# Patient Record
Sex: FEMALE | Race: BLACK OR AFRICAN AMERICAN | Employment: OTHER | URBAN - METROPOLITAN AREA
[De-identification: names, ages, dates, MRNs, and addresses within clinical notes are randomized per-mention and may not be internally consistent; named-entity substitution may affect disease eponyms.]

---

## 2017-01-24 ENCOUNTER — PATIENT OUTREACH (OUTPATIENT)
Dept: FAMILY MEDICINE CLINIC | Facility: CLINIC | Age: 72
End: 2017-01-24

## 2017-01-24 NOTE — PROGRESS NOTES
Called Pt. To inform that she is due for her Medicare Annual Wellness visit. The phone number on file is out of service.

## 2017-03-17 ENCOUNTER — OFFICE VISIT (OUTPATIENT)
Dept: FAMILY MEDICINE CLINIC | Facility: CLINIC | Age: 72
End: 2017-03-17

## 2017-03-17 VITALS
HEART RATE: 68 BPM | DIASTOLIC BLOOD PRESSURE: 55 MMHG | SYSTOLIC BLOOD PRESSURE: 92 MMHG | BODY MASS INDEX: 36.85 KG/M2 | HEIGHT: 61 IN | WEIGHT: 195.19 LBS

## 2017-03-17 DIAGNOSIS — I50.9 CHRONIC CONGESTIVE HEART FAILURE, UNSPECIFIED CONGESTIVE HEART FAILURE TYPE: ICD-10-CM

## 2017-03-17 DIAGNOSIS — R26.81 GAIT INSTABILITY: ICD-10-CM

## 2017-03-17 DIAGNOSIS — I95.2 HYPOTENSION DUE TO DRUGS: ICD-10-CM

## 2017-03-17 DIAGNOSIS — R42 DIZZINESS: ICD-10-CM

## 2017-03-17 DIAGNOSIS — R26.89 BALANCE PROBLEM: ICD-10-CM

## 2017-03-17 DIAGNOSIS — D50.9 IRON DEFICIENCY ANEMIA, UNSPECIFIED IRON DEFICIENCY ANEMIA TYPE: ICD-10-CM

## 2017-03-17 DIAGNOSIS — E66.01 SEVERE OBESITY (BMI 35.0-39.9): ICD-10-CM

## 2017-03-17 DIAGNOSIS — E11.9 TYPE 2 DIABETES MELLITUS WITHOUT COMPLICATION, WITHOUT LONG-TERM CURRENT USE OF INSULIN (HCC): Primary | ICD-10-CM

## 2017-03-17 DIAGNOSIS — R29.898 WEAKNESS OF BOTH LOWER EXTREMITIES: ICD-10-CM

## 2017-03-17 DIAGNOSIS — I10 ESSENTIAL HYPERTENSION: ICD-10-CM

## 2017-03-17 DIAGNOSIS — Z95.0 PACEMAKER: ICD-10-CM

## 2017-03-17 DIAGNOSIS — E87.6 HYPOKALEMIA: ICD-10-CM

## 2017-03-17 PROCEDURE — 99205 OFFICE O/P NEW HI 60 MIN: CPT | Performed by: FAMILY MEDICINE

## 2017-03-17 RX ORDER — LOSARTAN POTASSIUM 100 MG/1
TABLET ORAL
COMMUNITY
End: 2017-09-28

## 2017-03-17 RX ORDER — MINERAL OIL 100 MG/100ML
OIL ORAL; TOPICAL
Qty: 1 DEVICE | Refills: 0 | Status: SHIPPED | OUTPATIENT
Start: 2017-03-17

## 2017-03-17 RX ORDER — CARVEDILOL 25 MG/1
25 TABLET ORAL 2 TIMES DAILY WITH MEALS
COMMUNITY
End: 2017-03-17

## 2017-03-17 RX ORDER — POTASSIUM CHLORIDE 750 MG/1
10 CAPSULE, EXTENDED RELEASE ORAL DAILY
COMMUNITY
End: 2017-04-03 | Stop reason: DRUGHIGH

## 2017-03-17 RX ORDER — FUROSEMIDE 40 MG/1
40 TABLET ORAL 2 TIMES DAILY
COMMUNITY
Start: 2016-01-19 | End: 2017-12-20

## 2017-03-17 RX ORDER — CARVEDILOL 25 MG/1
25 TABLET ORAL 2 TIMES DAILY WITH MEALS
Qty: 30 TABLET | Refills: 0 | Status: SHIPPED | OUTPATIENT
Start: 2017-03-17 | End: 2017-03-28

## 2017-03-17 RX ORDER — AMLODIPINE BESYLATE 5 MG/1
TABLET ORAL
COMMUNITY
End: 2017-09-28

## 2017-03-17 RX ORDER — HYDROCHLOROTHIAZIDE 25 MG/1
25 TABLET ORAL DAILY
COMMUNITY
End: 2017-12-20

## 2017-03-17 NOTE — PATIENT INSTRUCTIONS
If your dizziness worsens, call 911. Take 1/2 tab of the Amlodipine once a day. Take 1/2 tab of the Losartan once a day. Continue the rest of your medications as instructed.     Call and make an appointment to see your cardiologist Dr. Sukhdev Burr or

## 2017-03-17 NOTE — PROGRESS NOTES
Minna Galeazzi is a 70year old female. HPI:     New patient. Patient noted to be using a cane to assist with walking, patient was questioned regarding the use of the cane. Balance issues. Sometimes feels like she might fall. Denies falls.   D Disp:  Rfl:         Penicillins             Itching, Swelling   Past Medical History   Diagnosis Date   • Type 2 diabetes mellitus without complication, without long-term current use of insulin ([facility] 3/17/2017   • Chronic congestive heart failure ([facility] 3/1 sounds normal. She has no wheezes. She has no rales. Lymphadenopathy:     She has no cervical adenopathy. Neurological: Gait abnormal.   No focal deficits   Skin: Skin is warm and dry. Psychiatric: She has a normal mood and affect.  Her behavior is no readings on. Patient to bring her blood pressure monitor with her when she follows up with the cardiologist and when she comes to our office as well.     1. Type 2 diabetes mellitus without complication, without long-term current use of insulin ([facility]  St AmLODIPine Besylate 5 MG Oral Tab; Take 0.5 tablet daily   - furosemide 40 MG Oral Tab; Take 40 mg by mouth 2 (two) times daily.  - Potassium Chloride ER 10 MEQ Oral Cap CR; Take 10 mEq by mouth daily. - hydrochlorothiazide 25 MG Oral Tab;  Take 25 mg by m Device 1 Device 0      Sig: Use as directed      carvedilol 25 MG Oral Tab 30 tablet 0      Sig: Take 1 tablet (25 mg total) by mouth 2 (two) times daily with meals.            Imaging & Consults:  OP REFERRAL TO [facility]    Return in about 2 weeks (arou

## 2017-03-20 ENCOUNTER — TELEPHONE (OUTPATIENT)
Dept: FAMILY MEDICINE CLINIC | Facility: CLINIC | Age: 72
End: 2017-03-20

## 2017-03-20 NOTE — TELEPHONE ENCOUNTER
Medical records release successfully faxed to Dr Shital Linares @[phone], requesting most recent progress note, labs and immunization records.     Medical records release successfully faxed to Dr. Mildred Shin @[phone], requesting most recent prog

## 2017-03-24 ENCOUNTER — LAB ENCOUNTER (OUTPATIENT)
Dept: LAB | Age: 72
End: 2017-03-24
Attending: FAMILY MEDICINE
Payer: MEDICARE

## 2017-03-24 DIAGNOSIS — E11.9 TYPE 2 DIABETES MELLITUS WITHOUT COMPLICATION, WITHOUT LONG-TERM CURRENT USE OF INSULIN (HCC): ICD-10-CM

## 2017-03-24 DIAGNOSIS — D50.9 IRON DEFICIENCY ANEMIA, UNSPECIFIED IRON DEFICIENCY ANEMIA TYPE: ICD-10-CM

## 2017-03-24 LAB
ALBUMIN SERPL-MCNC: 3.8 G/DL (ref 3.5–4.8)
ALP LIVER SERPL-CCNC: 67 U/L (ref 55–142)
ALT SERPL-CCNC: 15 U/L (ref 14–54)
AST SERPL-CCNC: 14 U/L (ref 15–41)
BASOPHILS # BLD AUTO: 0.03 X10(3) UL (ref 0–0.1)
BASOPHILS NFR BLD AUTO: 0.6 %
BILIRUB SERPL-MCNC: 0.4 MG/DL (ref 0.1–2)
BUN BLD-MCNC: 37 MG/DL (ref 8–20)
CALCIUM BLD-MCNC: 9.4 MG/DL (ref 8.3–10.3)
CHLORIDE: 100 MMOL/L (ref 101–111)
CHOLEST SMN-MCNC: 191 MG/DL (ref ?–200)
CO2: 33 MMOL/L (ref 22–32)
CREAT BLD-MCNC: 1.66 MG/DL (ref 0.55–1.02)
CREAT UR-SCNC: 130 MG/DL
DEPRECATED HBV CORE AB SER IA-ACNC: 74.2 NG/ML (ref 10–291)
EOSINOPHIL # BLD AUTO: 0.21 X10(3) UL (ref 0–0.3)
EOSINOPHIL NFR BLD AUTO: 4.2 %
ERYTHROCYTE [DISTWIDTH] IN BLOOD BY AUTOMATED COUNT: 13.9 % (ref 11.5–16)
EST. AVERAGE GLUCOSE BLD GHB EST-MCNC: 123 MG/DL (ref 68–126)
FREE T4: 1 NG/DL (ref 0.9–1.8)
GLUCOSE BLD-MCNC: 96 MG/DL (ref 70–99)
HBA1C MFR BLD HPLC: 5.9 % (ref ?–5.7)
HCT VFR BLD AUTO: 31.1 % (ref 34–50)
HDLC SERPL-MCNC: 70 MG/DL (ref 45–?)
HDLC SERPL: 2.73 {RATIO} (ref ?–4.44)
HGB BLD-MCNC: 9.9 G/DL (ref 12–16)
IMMATURE GRANULOCYTE COUNT: 0 X10(3) UL (ref 0–1)
IMMATURE GRANULOCYTE RATIO %: 0 %
IRON SATURATION: 16 % (ref 13–45)
IRON: 56 UG/DL (ref 28–170)
LDLC SERPL CALC-MCNC: 106 MG/DL (ref ?–130)
LYMPHOCYTES # BLD AUTO: 2.09 X10(3) UL (ref 0.9–4)
LYMPHOCYTES NFR BLD AUTO: 42.1 %
M PROTEIN MFR SERPL ELPH: 7.5 G/DL (ref 6.1–8.3)
MCH RBC QN AUTO: 28.9 PG (ref 27–33.2)
MCHC RBC AUTO-ENTMCNC: 31.8 G/DL (ref 31–37)
MCV RBC AUTO: 90.7 FL (ref 81–100)
MICROALBUMIN UR-MCNC: <0.5 MG/DL
MONOCYTES # BLD AUTO: 0.8 X10(3) UL (ref 0.1–0.6)
MONOCYTES NFR BLD AUTO: 16.1 %
NEUTROPHIL ABS PRELIM: 1.83 X10 (3) UL (ref 1.3–6.7)
NEUTROPHILS # BLD AUTO: 1.83 X10(3) UL (ref 1.3–6.7)
NEUTROPHILS NFR BLD AUTO: 37 %
NONHDLC SERPL-MCNC: 121 MG/DL (ref ?–130)
PLATELET # BLD AUTO: 211 10(3)UL (ref 150–450)
POTASSIUM SERPL-SCNC: 3.5 MMOL/L (ref 3.6–5.1)
RBC # BLD AUTO: 3.43 X10(6)UL (ref 3.8–5.1)
RED CELL DISTRIBUTION WIDTH-SD: 46.1 FL (ref 35.1–46.3)
SODIUM SERPL-SCNC: 140 MMOL/L (ref 136–144)
TOTAL IRON BINDING CAPACITY: 344 UG/DL (ref 298–536)
TRANSFERRIN: 231 MG/DL (ref 200–360)
TRIGLYCERIDES: 73 MG/DL (ref ?–150)
TSI SER-ACNC: 1.5 MIU/ML (ref 0.35–5.5)
VLDL: 15 MG/DL (ref 5–40)
WBC # BLD AUTO: 5 X10(3) UL (ref 4–13)

## 2017-03-24 PROCEDURE — 80053 COMPREHEN METABOLIC PANEL: CPT

## 2017-03-24 PROCEDURE — 82728 ASSAY OF FERRITIN: CPT

## 2017-03-24 PROCEDURE — 84439 ASSAY OF FREE THYROXINE: CPT

## 2017-03-24 PROCEDURE — 36415 COLL VENOUS BLD VENIPUNCTURE: CPT

## 2017-03-24 PROCEDURE — 83036 HEMOGLOBIN GLYCOSYLATED A1C: CPT

## 2017-03-24 PROCEDURE — 84443 ASSAY THYROID STIM HORMONE: CPT

## 2017-03-24 PROCEDURE — 85025 COMPLETE CBC W/AUTO DIFF WBC: CPT

## 2017-03-24 PROCEDURE — 82043 UR ALBUMIN QUANTITATIVE: CPT

## 2017-03-24 PROCEDURE — 83540 ASSAY OF IRON: CPT

## 2017-03-24 PROCEDURE — 80061 LIPID PANEL: CPT

## 2017-03-24 PROCEDURE — 83550 IRON BINDING TEST: CPT

## 2017-03-24 PROCEDURE — 82570 ASSAY OF URINE CREATININE: CPT

## 2017-03-28 ENCOUNTER — TELEPHONE (OUTPATIENT)
Dept: FAMILY MEDICINE CLINIC | Facility: CLINIC | Age: 72
End: 2017-03-28

## 2017-03-28 DIAGNOSIS — I50.9 CHRONIC CONGESTIVE HEART FAILURE, UNSPECIFIED CONGESTIVE HEART FAILURE TYPE: Primary | ICD-10-CM

## 2017-03-28 DIAGNOSIS — I10 ESSENTIAL HYPERTENSION: ICD-10-CM

## 2017-03-28 RX ORDER — CARVEDILOL 25 MG/1
25 TABLET ORAL 2 TIMES DAILY WITH MEALS
Qty: 60 TABLET | Refills: 0 | Status: SHIPPED | OUTPATIENT
Start: 2017-03-28 | End: 2017-04-06

## 2017-03-28 NOTE — TELEPHONE ENCOUNTER
A 15 day supply of the carvedilol was sent on 3/17/2017  A 30 day supply was sent today  Future Appointments  Date Time Provider Toni Neville   4/3/2017 12:00 PM Gary Cordero DO [facility] [place]

## 2017-04-03 ENCOUNTER — OFFICE VISIT (OUTPATIENT)
Dept: FAMILY MEDICINE CLINIC | Facility: CLINIC | Age: 72
End: 2017-04-03

## 2017-04-03 VITALS
BODY MASS INDEX: 36.75 KG/M2 | SYSTOLIC BLOOD PRESSURE: 103 MMHG | DIASTOLIC BLOOD PRESSURE: 59 MMHG | HEART RATE: 70 BPM | HEIGHT: 61 IN | WEIGHT: 194.63 LBS

## 2017-04-03 DIAGNOSIS — E11.22 DIABETES MELLITUS WITH STAGE 3 CHRONIC KIDNEY DISEASE (HCC): ICD-10-CM

## 2017-04-03 DIAGNOSIS — Z95.0 PACEMAKER: ICD-10-CM

## 2017-04-03 DIAGNOSIS — I50.9 CHRONIC CONGESTIVE HEART FAILURE, UNSPECIFIED CONGESTIVE HEART FAILURE TYPE: ICD-10-CM

## 2017-04-03 DIAGNOSIS — Z12.11 SCREENING FOR MALIGNANT NEOPLASM OF COLON: ICD-10-CM

## 2017-04-03 DIAGNOSIS — D50.9 IRON DEFICIENCY ANEMIA, UNSPECIFIED IRON DEFICIENCY ANEMIA TYPE: ICD-10-CM

## 2017-04-03 DIAGNOSIS — N18.30 DIABETES MELLITUS WITH STAGE 3 CHRONIC KIDNEY DISEASE (HCC): ICD-10-CM

## 2017-04-03 DIAGNOSIS — Z12.31 ENCOUNTER FOR SCREENING MAMMOGRAM FOR MALIGNANT NEOPLASM OF BREAST: ICD-10-CM

## 2017-04-03 DIAGNOSIS — I95.2 HYPOTENSION DUE TO DRUGS: ICD-10-CM

## 2017-04-03 DIAGNOSIS — Z79.84 LONG TERM CURRENT USE OF ORAL HYPOGLYCEMIC DRUG: ICD-10-CM

## 2017-04-03 DIAGNOSIS — R42 DIZZINESS: ICD-10-CM

## 2017-04-03 DIAGNOSIS — I10 ESSENTIAL HYPERTENSION: ICD-10-CM

## 2017-04-03 DIAGNOSIS — I13.0 MALIGNANT HYPERTENSIVE HEART AND RENAL DISEASE WITH HEART FAILURE (HCC): ICD-10-CM

## 2017-04-03 DIAGNOSIS — Z00.00 ENCOUNTER FOR ANNUAL HEALTH EXAMINATION: Primary | ICD-10-CM

## 2017-04-03 DIAGNOSIS — D64.9 ANEMIA, UNSPECIFIED TYPE: ICD-10-CM

## 2017-04-03 DIAGNOSIS — E87.6 HYPOKALEMIA: ICD-10-CM

## 2017-04-03 DIAGNOSIS — E66.01 SEVERE OBESITY (BMI 35.0-39.9): ICD-10-CM

## 2017-04-03 PROCEDURE — 96160 PT-FOCUSED HLTH RISK ASSMT: CPT | Performed by: FAMILY MEDICINE

## 2017-04-03 RX ORDER — POTASSIUM CHLORIDE 1.5 G/1.77G
20 POWDER, FOR SOLUTION ORAL DAILY
Qty: 30 PACKET | Refills: 5 | Status: SHIPPED | OUTPATIENT
Start: 2017-04-03 | End: 2017-08-09 | Stop reason: CLARIF

## 2017-04-03 NOTE — PATIENT INSTRUCTIONS
Please call our office in 5 days to see if the referral to see your cardiologist has been approved. The referral needs to be approved before you can make an appointment to see Dr. Kari Hare. Discontinue the iron for now.     Please make an appointment

## 2017-04-03 NOTE — PROGRESS NOTES
HPI:   Minna Galeazzi is a 70year old female who presents for a MA (Medicare Advantage) Supervisit (Once per calendar year). Dizziness resolved since decreasing some of the BP meds.     Declines screening mammogram. But then changes mind at end o hydrochlorothiazide 25 MG Oral Tab Take 25 mg by mouth daily. losartan 100 MG Oral Tab Take 0.5 tablet daily. MetFORMIN HCl 500 MG Oral Tab Take 500 mg by mouth 2 (two) times daily with meals.    IRON OR    Blood Pressure Monitor Does not apply Devibehzad Assessment  (Required for AWV/SWV)    Finger Rub       /59 mmHg  Pulse 70  Ht 61\"  Wt 194 lb 9.6 oz  BMI 36.79 kg/m2  General appearance: alert, appears stated age and cooperative  Head: Normocephalic, without obvious abnormality, atraumatic  Eyes: 11:07 AM   Result Value Ref Range   Cholesterol, Total 191 <200 mg/dL   Triglycerides 73 <150 mg/dL   HDL Cholesterol 70 >45 mg/dL   LDL Cholesterol 106 <130 mg/dL   VLDL 15 5-40 mg/dL   Chol/HDL Ratio 2.73 <4.44   Non HDL Chol 121 <130 mg/dL   -ASSAY, THY Basophil % 0.6 %   Immature Granulocyte % 0.0 %         ASSESSMENT AND OTHER RELEVANT CHRONIC CONDITIONS:   Mono Muñoz is a 70year old female who presents for a Medicare Assessment.      Patient signed record release to obtain immunization recor foods.  - potassium chloride 20 MEQ Oral Powd Pack; Take 20 mEq by mouth daily. Dispense: 30 packet; Refill: 5    10. Iron deficiency anemia, unspecified iron deficiency anemia type  Okay to continue iron supplement for now.       11. Hypotension due to dr Directive:  Living Will on file in [facility]? Ayala Shah does not have a Living Will on file in [facility]  Discussed with patient and provided information      Healthcare Power of Justo on file in Epic:    Ayala Shah does not have a Power of At difficulty seeing?: 0-No    Do you have any difficulty walking or getting up?: 0-No    Do you have any tripping hazards?: 0-No    Are you on multiple medications?: 1-Yes    Does pain affect your day to day activities?: 1-Yes     Have you had any memory iss Annually: Diabetics, FHx Glaucoma, AA>50, > 65 No flowsheet data found. Bone Density Screening      Dexascan Every two years No results found for this or any previous visit. No flowsheet data found.     Pap and Pelvic      Pap: Every 3 yrs age 24 Date Value   03/24/2017 3.5*    No flowsheet data found. Creatinine  Annually CREATININE (mg/dL)   Date Value   03/24/2017 1.66*    No flowsheet data found. BUN  Annually BUN (mg/dL)   Date Value   03/24/2017 37*    No flowsheet data found.      [place]

## 2017-04-04 PROBLEM — N18.30 CKD (CHRONIC KIDNEY DISEASE) STAGE 3, GFR 30-59 ML/MIN (HCC): Status: ACTIVE | Noted: 2017-04-04

## 2017-04-04 PROBLEM — R73.9 HYPERGLYCEMIA: Status: RESOLVED | Noted: 2017-04-04 | Resolved: 2017-04-04

## 2017-04-04 PROBLEM — D64.9 ANEMIA: Status: ACTIVE | Noted: 2017-04-04

## 2017-04-04 PROBLEM — R73.9 HYPERGLYCEMIA: Status: ACTIVE | Noted: 2017-04-04

## 2017-04-05 ENCOUNTER — TELEPHONE (OUTPATIENT)
Dept: FAMILY MEDICINE CLINIC | Facility: CLINIC | Age: 72
End: 2017-04-05

## 2017-04-05 NOTE — TELEPHONE ENCOUNTER
Spoke to patient's daughter with name of Dr. Hansa Aldrich and number that they will contact for her cardio appt. Explained that the Dr. Siobhan Dykes was listed the first time is not in their HMO. She verbalized understanding.

## 2017-04-06 DIAGNOSIS — I50.9 CHRONIC CONGESTIVE HEART FAILURE, UNSPECIFIED CONGESTIVE HEART FAILURE TYPE: Primary | ICD-10-CM

## 2017-04-06 DIAGNOSIS — I10 ESSENTIAL HYPERTENSION: ICD-10-CM

## 2017-04-06 NOTE — TELEPHONE ENCOUNTER
Fax request received from [phone] requesting 90 day rx for carvedilol    Dr Tyrone Lloyd for this refill or will patient be getting this from the cardiologist?

## 2017-04-09 RX ORDER — CARVEDILOL 25 MG/1
25 TABLET ORAL 2 TIMES DAILY WITH MEALS
Qty: 180 TABLET | Refills: 0 | Status: SHIPPED | OUTPATIENT
Start: 2017-04-09 | End: 2017-08-02

## 2017-04-10 ENCOUNTER — TELEPHONE (OUTPATIENT)
Dept: FAMILY MEDICINE CLINIC | Facility: CLINIC | Age: 72
End: 2017-04-10

## 2017-04-10 NOTE — TELEPHONE ENCOUNTER
This med was already sent on 4/9/17 to [facility] [place]. Pt informed of this and she said that she got only #60 tablets. Asked pt to call [facility] and explain that #180 was sent over on 4/9/17. Pt verbalized understanding.

## 2017-05-12 ENCOUNTER — MED REC SCAN ONLY (OUTPATIENT)
Dept: FAMILY MEDICINE CLINIC | Facility: CLINIC | Age: 72
End: 2017-05-12

## 2017-05-12 PROBLEM — I42.0 CARDIOMYOPATHY, DILATED (HCC): Status: ACTIVE | Noted: 2017-05-12

## 2017-06-06 ENCOUNTER — TELEPHONE (OUTPATIENT)
Dept: FAMILY MEDICINE CLINIC | Facility: CLINIC | Age: 72
End: 2017-06-06

## 2017-06-06 DIAGNOSIS — Z95.0 PACEMAKER: Primary | ICD-10-CM

## 2017-06-06 DIAGNOSIS — I50.9 CHRONIC CONGESTIVE HEART FAILURE, UNSPECIFIED CONGESTIVE HEART FAILURE TYPE: ICD-10-CM

## 2017-06-06 DIAGNOSIS — I42.0 CARDIOMYOPATHY, DILATED (HCC): ICD-10-CM

## 2017-06-06 NOTE — TELEPHONE ENCOUNTER
Dr Дмитрий Rivera advise. Looking at [facility] list for cardiology there is a [facility] group in [place].  They appear to be only group in [facility]

## 2017-06-06 NOTE — TELEPHONE ENCOUNTER
Pt called and said she needs Dr. Ema Gaspar to refer her to a doctor in [address] [place] [address]) to check her pacemaker. She said [place] she has to go to [place] she has to transfer 3 times before she can get to her destination.

## 2017-06-16 ENCOUNTER — TELEPHONE (OUTPATIENT)
Dept: FAMILY MEDICINE CLINIC | Facility: CLINIC | Age: 72
End: 2017-06-16

## 2017-06-16 DIAGNOSIS — E11.9 TYPE 2 DIABETES MELLITUS WITHOUT COMPLICATION, WITHOUT LONG-TERM CURRENT USE OF INSULIN (HCC): Primary | ICD-10-CM

## 2017-06-16 NOTE — TELEPHONE ENCOUNTER
Refill request received from [facility] for metformin 500 mg 1 tab bid  Per ov 4/3/17:2.  Type 2 diabetes mellitus without complication, without long-term current use of insulin ([address]  Continue metformin.  Patient counseled on healthy diet and regular

## 2017-06-16 NOTE — TELEPHONE ENCOUNTER
Left message for Kenisha Kathleen @ Dr John Salcedo office. We do not have an EKG tracing for this patient.

## 2017-06-21 PROBLEM — I50.22 CHRONIC SYSTOLIC CONGESTIVE HEART FAILURE (HCC): Status: ACTIVE | Noted: 2017-06-21

## 2017-07-27 ENCOUNTER — TELEPHONE (OUTPATIENT)
Dept: FAMILY MEDICINE CLINIC | Facility: CLINIC | Age: 72
End: 2017-07-27

## 2017-07-27 DIAGNOSIS — I10 ESSENTIAL HYPERTENSION: ICD-10-CM

## 2017-07-27 DIAGNOSIS — I50.9 CHRONIC CONGESTIVE HEART FAILURE, UNSPECIFIED CONGESTIVE HEART FAILURE TYPE: Primary | ICD-10-CM

## 2017-07-27 NOTE — TELEPHONE ENCOUNTER
Pt was given an order for a BP machine back in March. Pt took this to [facility] to get filled. Now per [facility] they billed an incorrect insurance for this item and it was rejected now they want to try to bill it to [facility] for pt.     Explained

## 2017-08-02 DIAGNOSIS — I50.9 CHRONIC CONGESTIVE HEART FAILURE, UNSPECIFIED CONGESTIVE HEART FAILURE TYPE: ICD-10-CM

## 2017-08-02 DIAGNOSIS — I10 ESSENTIAL HYPERTENSION: ICD-10-CM

## 2017-08-02 RX ORDER — CARVEDILOL 25 MG/1
TABLET ORAL
Qty: 180 TABLET | Refills: 0 | Status: SHIPPED | OUTPATIENT
Start: 2017-08-02 | End: 2018-05-15

## 2017-08-09 ENCOUNTER — TELEPHONE (OUTPATIENT)
Dept: FAMILY MEDICINE CLINIC | Facility: CLINIC | Age: 72
End: 2017-08-09

## 2017-08-09 RX ORDER — POTASSIUM CHLORIDE 1500 MG/1
20 TABLET, FILM COATED, EXTENDED RELEASE ORAL DAILY
Qty: 90 TABLET | Refills: 0 | OUTPATIENT
Start: 2017-08-09 | End: 2017-09-08

## 2017-08-09 NOTE — TELEPHONE ENCOUNTER
Spoke to [facility] and he said that the potassium script was never sent to patient back in April  because it was too expensive for pt because it was the powder packets so called in today the oral pills for 90 days.   Spoke to [place], [facility] and called

## 2017-08-21 DIAGNOSIS — E11.9 TYPE 2 DIABETES MELLITUS WITHOUT COMPLICATION, WITHOUT LONG-TERM CURRENT USE OF INSULIN (HCC): ICD-10-CM

## 2017-09-28 DIAGNOSIS — E87.6 HYPOKALEMIA: ICD-10-CM

## 2017-09-28 DIAGNOSIS — E11.9 TYPE 2 DIABETES MELLITUS WITHOUT COMPLICATION, WITHOUT LONG-TERM CURRENT USE OF INSULIN (HCC): Primary | ICD-10-CM

## 2017-09-28 DIAGNOSIS — I50.9 CHRONIC CONGESTIVE HEART FAILURE, UNSPECIFIED CONGESTIVE HEART FAILURE TYPE: ICD-10-CM

## 2017-09-28 DIAGNOSIS — N18.30 CKD (CHRONIC KIDNEY DISEASE) STAGE 3, GFR 30-59 ML/MIN (HCC): ICD-10-CM

## 2017-09-28 DIAGNOSIS — I10 ESSENTIAL HYPERTENSION: ICD-10-CM

## 2017-09-28 RX ORDER — AMLODIPINE BESYLATE 5 MG/1
2.5 TABLET ORAL DAILY
Qty: 45 TABLET | Refills: 0 | Status: SHIPPED | OUTPATIENT
Start: 2017-09-28 | End: 2017-11-08

## 2017-09-28 RX ORDER — LOSARTAN POTASSIUM 100 MG/1
50 TABLET ORAL DAILY
Qty: 45 TABLET | Refills: 0 | Status: SHIPPED | OUTPATIENT
Start: 2017-09-28 | End: 2017-11-08 | Stop reason: DRUGHIGH

## 2017-09-28 NOTE — TELEPHONE ENCOUNTER
Pt needs a refill on Losartan 100mg 1 daily,  Amlodipine 5mg 1 daily. She would like them sent to [facility].

## 2017-09-28 NOTE — TELEPHONE ENCOUNTER
Dr Deyvi Tovar advise refills of losartan and amlodipine. You have not prescribed these in the past for the patient.  Will you be prescribing these or will her cardiologist?    Patient is asking for a 90 day supply and she was to return 7/3/17

## 2017-09-28 NOTE — TELEPHONE ENCOUNTER
Meds approved.     Labs ordered, please have pt get them done so results area available for her upcoming appt 10/18/2017

## 2017-09-29 NOTE — TELEPHONE ENCOUNTER
Spoke to patient with instructions and pt verbalized understanding.   Gave her the number to [employer] to schedule her lab appt

## 2017-10-03 NOTE — TELEPHONE ENCOUNTER
Faxed refill request from [facility] for refill of HCTZ 25 mg  Dr Willi Mejia advise refill.  You have not prescribed this for the patient in the past

## 2017-10-05 NOTE — TELEPHONE ENCOUNTER
Refill request should go to her cardiologist.  Also, Please call patient's cardiologist and find out what meds she is supposed to be on. For example, is she taking both furosemide and HCTZ?

## 2017-10-10 RX ORDER — POTASSIUM CHLORIDE 20 MEQ/1
TABLET, EXTENDED RELEASE ORAL
Qty: 90 TABLET | Refills: 0 | Status: SHIPPED | OUTPATIENT
Start: 2017-10-10 | End: 2017-12-11

## 2017-10-12 RX ORDER — HYDROCHLOROTHIAZIDE 25 MG/1
25 TABLET ORAL DAILY
Qty: 90 TABLET | Refills: 0 | OUTPATIENT
Start: 2017-10-12

## 2017-10-23 DIAGNOSIS — E11.9 TYPE 2 DIABETES MELLITUS WITHOUT COMPLICATION, WITHOUT LONG-TERM CURRENT USE OF INSULIN (HCC): ICD-10-CM

## 2017-10-24 NOTE — TELEPHONE ENCOUNTER
Unable to refill metformin for 90 days  Patient was due for a f/u in 07/17  Please call to schedule appt  [phone] (home)   Can send 30 day supply to local pharmacy

## 2017-11-08 ENCOUNTER — TELEPHONE (OUTPATIENT)
Dept: FAMILY MEDICINE CLINIC | Facility: CLINIC | Age: 72
End: 2017-11-08

## 2017-11-08 ENCOUNTER — OFFICE VISIT (OUTPATIENT)
Dept: FAMILY MEDICINE CLINIC | Facility: CLINIC | Age: 72
End: 2017-11-08

## 2017-11-08 VITALS
HEART RATE: 67 BPM | SYSTOLIC BLOOD PRESSURE: 99 MMHG | BODY MASS INDEX: 37.04 KG/M2 | DIASTOLIC BLOOD PRESSURE: 64 MMHG | WEIGHT: 196.19 LBS | HEIGHT: 61 IN

## 2017-11-08 DIAGNOSIS — M79.601 PARESTHESIA AND PAIN OF BOTH UPPER EXTREMITIES: ICD-10-CM

## 2017-11-08 DIAGNOSIS — Z23 NEED FOR VACCINATION: ICD-10-CM

## 2017-11-08 DIAGNOSIS — E11.9 TYPE 2 DIABETES MELLITUS WITHOUT COMPLICATION, WITHOUT LONG-TERM CURRENT USE OF INSULIN (HCC): Primary | ICD-10-CM

## 2017-11-08 DIAGNOSIS — R20.2 PARESTHESIA AND PAIN OF BOTH UPPER EXTREMITIES: ICD-10-CM

## 2017-11-08 DIAGNOSIS — R20.2 PARESTHESIA OF BOTH LOWER EXTREMITIES: ICD-10-CM

## 2017-11-08 DIAGNOSIS — I10 ESSENTIAL HYPERTENSION: ICD-10-CM

## 2017-11-08 DIAGNOSIS — M79.602 PARESTHESIA AND PAIN OF BOTH UPPER EXTREMITIES: ICD-10-CM

## 2017-11-08 DIAGNOSIS — I50.9 CHRONIC CONGESTIVE HEART FAILURE, UNSPECIFIED CONGESTIVE HEART FAILURE TYPE: ICD-10-CM

## 2017-11-08 PROCEDURE — 90653 IIV ADJUVANT VACCINE IM: CPT | Performed by: FAMILY MEDICINE

## 2017-11-08 PROCEDURE — G0008 ADMIN INFLUENZA VIRUS VAC: HCPCS | Performed by: FAMILY MEDICINE

## 2017-11-08 PROCEDURE — 99214 OFFICE O/P EST MOD 30 MIN: CPT | Performed by: FAMILY MEDICINE

## 2017-11-08 RX ORDER — CARVEDILOL 25 MG/1
TABLET ORAL
Qty: 180 TABLET | Refills: 0 | Status: CANCELLED | OUTPATIENT
Start: 2017-11-08

## 2017-11-08 RX ORDER — HYDROCHLOROTHIAZIDE 25 MG/1
25 TABLET ORAL DAILY
Refills: 0 | Status: CANCELLED | OUTPATIENT
Start: 2017-11-08

## 2017-11-08 RX ORDER — LOSARTAN POTASSIUM 50 MG/1
50 TABLET ORAL DAILY
Qty: 90 TABLET | Refills: 1 | Status: SHIPPED | OUTPATIENT
Start: 2017-11-08 | End: 2017-12-20

## 2017-11-08 RX ORDER — LOSARTAN POTASSIUM 100 MG/1
50 TABLET ORAL DAILY
Qty: 45 TABLET | Refills: 0 | Status: CANCELLED | OUTPATIENT
Start: 2017-11-08

## 2017-11-08 RX ORDER — FUROSEMIDE 40 MG/1
40 TABLET ORAL 2 TIMES DAILY
Refills: 0 | Status: CANCELLED | OUTPATIENT
Start: 2017-11-08

## 2017-11-08 RX ORDER — AMLODIPINE BESYLATE 5 MG/1
2.5 TABLET ORAL DAILY
Qty: 45 TABLET | Refills: 0 | Status: CANCELLED | OUTPATIENT
Start: 2017-11-08

## 2017-11-08 NOTE — PROGRESS NOTES
HPI:   Anabell Murphy is a 67year old female      Warming sensations or cool sensations in wrist to elbow and ankle to the calf, both on both sides. Several weeks. Nothing seems to make it better or worse.     Type 2 diabetes mellitus:  Past due for • Iron deficiency anemia 3/17/2017   • Severe obesity (BMI 35.0-39.9) ([facility] 3/17/2017   • Type 2 diabetes mellitus without complication, without long-term current use of insulin ([facility] 3/17/2017      Past Surgical History:  3/12/2015: Nisha Clayton mg/dL 121   HEMOGLOBIN A1c      <5.7 % 5.9 (H)   ESTIMATED AVERAGE GLUCOSE      68 - 126 mg/dL 123         ASSESSMENT AND PLAN:   Bill Morales is a 67year old female     Type 2 diabetes mellitus without complication, without long-term current use o extremities  Etiology uncertain. Check B12 level to start.  - VITAMIN B12; Future    5. Paresthesia of both lower extremities  As above in #4.  - VITAMIN B12; Future    6.  Need for vaccination  - FLU VACCINE ADJUVANT IM              Orders Placed This Enc

## 2017-11-09 NOTE — TELEPHONE ENCOUNTER
Please call patient: Patient needs to call her cardiologist, Dr. Ck Arita, to request refills on her carvedilol, furosemide and hydrochlorothiazide. Please provide her with Dr. Rosemary Hardwick phone number.   I did send in a prescription for the losartan and metf

## 2017-11-16 ENCOUNTER — LAB ENCOUNTER (OUTPATIENT)
Dept: LAB | Age: 72
End: 2017-11-16
Attending: FAMILY MEDICINE
Payer: MEDICARE

## 2017-11-16 DIAGNOSIS — R20.2 PARESTHESIA AND PAIN OF BOTH UPPER EXTREMITIES: ICD-10-CM

## 2017-11-16 DIAGNOSIS — M79.602 PARESTHESIA AND PAIN OF BOTH UPPER EXTREMITIES: ICD-10-CM

## 2017-11-16 DIAGNOSIS — R20.2 PARESTHESIA OF BOTH LOWER EXTREMITIES: ICD-10-CM

## 2017-11-16 DIAGNOSIS — M79.601 PARESTHESIA AND PAIN OF BOTH UPPER EXTREMITIES: ICD-10-CM

## 2017-11-16 PROCEDURE — 82607 VITAMIN B-12: CPT

## 2017-11-27 ENCOUNTER — TELEPHONE (OUTPATIENT)
Dept: FAMILY MEDICINE CLINIC | Facility: CLINIC | Age: 72
End: 2017-11-27

## 2017-11-27 DIAGNOSIS — N18.30 CKD (CHRONIC KIDNEY DISEASE) STAGE 3, GFR 30-59 ML/MIN (HCC): Primary | ICD-10-CM

## 2017-11-27 NOTE — TELEPHONE ENCOUNTER
----- Message from Yajaira Overton DO sent at 11/26/2017 10:00 PM CST -----  Please call patient: Please remind patient she is due to see me for follow-up appointment the first week of December, please have patient make an appointment.   Diabetes is well c

## 2017-11-27 NOTE — TELEPHONE ENCOUNTER
----- Message from Edilberto Deleon DO sent at 11/26/2017 10:01 PM CST -----  Please see other result note, B12 in normal range

## 2017-11-27 NOTE — TELEPHONE ENCOUNTER
Pt informed of test results.   She states that she has been checking her blood pressure at home and it has been running between 120/69 and 120/78, she feels that at this time there is no reason for her to follow up with the cardiologist.   Pt states that sh

## 2017-11-28 NOTE — TELEPHONE ENCOUNTER
Please call patient:  1. I see that patient does have an appointment to see her cardiologist Dr. Vladislav Palmer scheduled for December 20, 2017, please remind patient to keep this appointment.   2.  Referral placed for patient to see a kidney specialist Dr. Hue Gonzalez

## 2017-11-29 NOTE — TELEPHONE ENCOUNTER
Spoke to patient with instructions and she states she does not have an appt scheduled in Dec. With cardiologist but she will contact their office to schedule appt. She will also call to schedule with the kidney doctor as well.

## 2017-12-06 RX ORDER — AMLODIPINE BESYLATE 5 MG/1
TABLET ORAL
Qty: 45 TABLET | Refills: 0 | Status: SHIPPED | OUTPATIENT
Start: 2017-12-06 | End: 2017-12-20

## 2017-12-12 RX ORDER — POTASSIUM CHLORIDE 20 MEQ/1
TABLET, EXTENDED RELEASE ORAL
Qty: 90 TABLET | Refills: 0 | Status: SHIPPED | OUTPATIENT
Start: 2017-12-12 | End: 2017-12-20

## 2018-01-16 DIAGNOSIS — E11.9 TYPE 2 DIABETES MELLITUS WITHOUT COMPLICATION, WITHOUT LONG-TERM CURRENT USE OF INSULIN (HCC): ICD-10-CM

## 2018-02-06 ENCOUNTER — TELEPHONE (OUTPATIENT)
Dept: FAMILY MEDICINE CLINIC | Facility: CLINIC | Age: 73
End: 2018-02-06

## 2018-02-06 DIAGNOSIS — H33.312 RETINAL HORSESHOE TEAR WITHOUT DETACHMENT, LEFT: Primary | ICD-10-CM

## 2018-02-06 NOTE — TELEPHONE ENCOUNTER
Nellie Nissen called from Dr. Maddy Horton's office and said pt needs a referral to be seen today @ 10:20. She said it was an emergent visit, she will be a new patient and she said the diagnosis is H53.10.   It can be faxed to [phone] and Nellie Nissen can be reach

## 2018-02-06 NOTE — TELEPHONE ENCOUNTER
Spoke to [place] and discussed that Dr. Irene Shah is not on the [facility] list .  No referral done.

## 2018-02-14 ENCOUNTER — TELEPHONE (OUTPATIENT)
Dept: FAMILY MEDICINE CLINIC | Facility: CLINIC | Age: 73
End: 2018-02-14

## 2018-02-14 DIAGNOSIS — H35.3221 EXUDATIVE AGE-RELATED MACULAR DEGENERATION OF LEFT EYE WITH ACTIVE CHOROIDAL NEOVASCULARIZATION (HCC): Primary | ICD-10-CM

## 2018-02-14 NOTE — TELEPHONE ENCOUNTER
Notes/referral request received from Dr. Teressa Taveras for pt for injection.   Notes attached to referral.

## 2018-03-21 DIAGNOSIS — E11.9 TYPE 2 DIABETES MELLITUS WITHOUT COMPLICATION, WITHOUT LONG-TERM CURRENT USE OF INSULIN (HCC): ICD-10-CM

## 2018-04-30 ENCOUNTER — TELEPHONE (OUTPATIENT)
Dept: FAMILY MEDICINE CLINIC | Facility: CLINIC | Age: 73
End: 2018-04-30

## 2018-04-30 DIAGNOSIS — E11.9 TYPE 2 DIABETES MELLITUS WITHOUT COMPLICATION, WITHOUT LONG-TERM CURRENT USE OF INSULIN (HCC): Primary | ICD-10-CM

## 2018-04-30 NOTE — TELEPHONE ENCOUNTER
Pt is calling asking for a mail order refill for her carvediol 25mg to be sent to [facility] mail order. Last fill 8/2/17    Doc,    OK to fill for 90 days?   Thanks  Or to go thru her cardiologist.

## 2018-05-01 NOTE — TELEPHONE ENCOUNTER
Spoke to patient and she said she has been taking this medication every day twice a day and was looking for a refill. Explained to her to contact Dr. Alex Newberry office to discuss this further regarding this medication and pt verbalized understanding.

## 2018-05-01 NOTE — TELEPHONE ENCOUNTER
1.  Please call the cardiologist's office and inform Dr. Zafar Fernández nurse that the patient just contacted us for a refill on the carvedilol 25 mg twice a day and that the last time we prescribed it was in August of last year for a 3 month supply.   Also, gold

## 2018-05-02 DIAGNOSIS — I10 ESSENTIAL HYPERTENSION: ICD-10-CM

## 2018-05-02 DIAGNOSIS — I50.9 CHRONIC CONGESTIVE HEART FAILURE (HCC): ICD-10-CM

## 2018-05-02 RX ORDER — CARVEDILOL 25 MG/1
TABLET ORAL
Qty: 180 TABLET | Refills: 0 | OUTPATIENT
Start: 2018-05-02

## 2018-05-07 ENCOUNTER — TELEPHONE (OUTPATIENT)
Dept: FAMILY MEDICINE CLINIC | Facility: CLINIC | Age: 73
End: 2018-05-07

## 2018-05-07 ENCOUNTER — OFFICE VISIT (OUTPATIENT)
Dept: FAMILY MEDICINE CLINIC | Facility: CLINIC | Age: 73
End: 2018-05-07

## 2018-05-07 VITALS
SYSTOLIC BLOOD PRESSURE: 110 MMHG | RESPIRATION RATE: 16 BRPM | HEIGHT: 63.5 IN | HEART RATE: 62 BPM | BODY MASS INDEX: 34.27 KG/M2 | DIASTOLIC BLOOD PRESSURE: 64 MMHG | WEIGHT: 195.81 LBS

## 2018-05-07 DIAGNOSIS — D50.9 IRON DEFICIENCY ANEMIA, UNSPECIFIED IRON DEFICIENCY ANEMIA TYPE: ICD-10-CM

## 2018-05-07 DIAGNOSIS — Z95.0 PACEMAKER: ICD-10-CM

## 2018-05-07 DIAGNOSIS — E11.22 DIABETES MELLITUS WITH STAGE 3 CHRONIC KIDNEY DISEASE (HCC): ICD-10-CM

## 2018-05-07 DIAGNOSIS — N18.30 CKD (CHRONIC KIDNEY DISEASE) STAGE 3, GFR 30-59 ML/MIN (HCC): ICD-10-CM

## 2018-05-07 DIAGNOSIS — E87.6 HYPOKALEMIA: ICD-10-CM

## 2018-05-07 DIAGNOSIS — Z23 NEED FOR VACCINATION: ICD-10-CM

## 2018-05-07 DIAGNOSIS — E66.01 SEVERE OBESITY (BMI 35.0-39.9): ICD-10-CM

## 2018-05-07 DIAGNOSIS — H35.3221 EXUDATIVE AGE-RELATED MACULAR DEGENERATION OF LEFT EYE WITH ACTIVE CHOROIDAL NEOVASCULARIZATION (HCC): Primary | ICD-10-CM

## 2018-05-07 DIAGNOSIS — R29.898 WEAKNESS OF BOTH LOWER EXTREMITIES: ICD-10-CM

## 2018-05-07 DIAGNOSIS — R42 DIZZINESS: ICD-10-CM

## 2018-05-07 DIAGNOSIS — N18.30 DIABETES MELLITUS WITH STAGE 3 CHRONIC KIDNEY DISEASE (HCC): ICD-10-CM

## 2018-05-07 DIAGNOSIS — I42.0 CARDIOMYOPATHY, DILATED (HCC): ICD-10-CM

## 2018-05-07 DIAGNOSIS — R60.0 BILATERAL LOWER EXTREMITY EDEMA: ICD-10-CM

## 2018-05-07 DIAGNOSIS — I10 ESSENTIAL HYPERTENSION: ICD-10-CM

## 2018-05-07 DIAGNOSIS — I50.22 CHRONIC SYSTOLIC CONGESTIVE HEART FAILURE (HCC): ICD-10-CM

## 2018-05-07 DIAGNOSIS — R26.89 BALANCE PROBLEM: ICD-10-CM

## 2018-05-07 DIAGNOSIS — Z53.20 MAMMOGRAM DECLINED: ICD-10-CM

## 2018-05-07 DIAGNOSIS — R26.81 GAIT INSTABILITY: ICD-10-CM

## 2018-05-07 DIAGNOSIS — Z00.00 MEDICARE ANNUAL WELLNESS VISIT, SUBSEQUENT: Primary | ICD-10-CM

## 2018-05-07 DIAGNOSIS — Z12.11 SCREENING FOR MALIGNANT NEOPLASM OF COLON: ICD-10-CM

## 2018-05-07 PROBLEM — D64.9 ANEMIA: Status: RESOLVED | Noted: 2017-04-04 | Resolved: 2018-05-07

## 2018-05-07 PROBLEM — E11.9 TYPE 2 DIABETES MELLITUS WITHOUT COMPLICATION, WITHOUT LONG-TERM CURRENT USE OF INSULIN (HCC): Status: RESOLVED | Noted: 2017-03-17 | Resolved: 2018-05-07

## 2018-05-07 PROBLEM — I95.2 HYPOTENSION DUE TO DRUGS: Status: RESOLVED | Noted: 2017-03-17 | Resolved: 2018-05-07

## 2018-05-07 PROBLEM — I50.9 CHRONIC CONGESTIVE HEART FAILURE (HCC): Status: RESOLVED | Noted: 2017-03-17 | Resolved: 2018-05-07

## 2018-05-07 PROCEDURE — 90670 PCV13 VACCINE IM: CPT | Performed by: FAMILY MEDICINE

## 2018-05-07 PROCEDURE — G0438 PPPS, INITIAL VISIT: HCPCS | Performed by: FAMILY MEDICINE

## 2018-05-07 PROCEDURE — G0009 ADMIN PNEUMOCOCCAL VACCINE: HCPCS | Performed by: FAMILY MEDICINE

## 2018-05-07 PROCEDURE — 96160 PT-FOCUSED HLTH RISK ASSMT: CPT | Performed by: FAMILY MEDICINE

## 2018-05-07 NOTE — PATIENT INSTRUCTIONS
Please get blood tests done as soon as possible. If you do not get a phone call from home health this week, call to let us know so we can contact them again. Please make an appointment to see your cardiologist Dr. Woody Patiño in June 2018.

## 2018-05-07 NOTE — PROGRESS NOTES
HPI:   Kathrin Rodriguez is a 67year old female who presents for a MA (Medicare Advantage) Supervisit (Once per calendar year).              Fall/Risk Assessment   She has been screened for Falls and is low risk: Fall/Risk Scorin    Cognitive Ass She has never smoked tobacco.    CAGE Alcohol screening   Maryjo Prasad was screened for Alcohol abuse and had a score of 0 so is at low risk.     Patient Care Team: Patient Care Team:  Celi Lenz DO as PCP - [address] Pt taking 0.5 tab twice daily )   hydrochlorothiazide 25 MG Oral Tab Take 1 tablet (25 mg total) by mouth daily. furosemide 40 MG Oral Tab Take 1 tablet (40 mg total) by mouth 2 (two) times daily.    CARVEDILOL 25 MG Oral Tab TAKE 1 TABLET TWICE DAILY WIT herself. CARDIOVASCULAR: denies chest pain on exertion, however patient does not have occasion to exert herself.   GI: denies abdominal pain, denies heartburn  : denies dysuria, vaginal discharge or itching   MUSCULOSKELETAL: denies back pain  NEURO: den History   Administered Date(s) Administered   • Fluad High dose 65 yr and older (48201) 11/08/2017   • Pneumococcal (Prevnar 13) 05/07/2018   • Pneumovax 23 03/04/2014        ASSESSMENT AND OTHER RELEVANT CHRONIC CONDITIONS:   Kathrin Rodriguez is a 67 y as instructed. Get labs done at earliest convenience. - CARDIO - INTERNAL    7. CKD (chronic kidney disease) stage 3, GFR 30-59 ml/min  Labs pending. Patient counseled on maintaining good hydration. Avoid NSAIDs.     8. Iron deficiency anemia, unspeci you lost more than 10 pounds without trying?: 2 - No  Has your appetite been poor?: No  How does the patient maintain a good energy level?: Appropriate Exercise  How would you describe your daily physical activity?: Light  How would you describe your curre Screening Mammogram      Mammogram Annually to 76, then as discussed Mammogram,1 Yr due on 07/29/1985 Update Health Maintenance if applicable     Immunizations (Update Immunization Activity if applicable)     Influenza  Covered Annually 11/8/2017 Please mg/dL            LDL  Annually LDL Cholesterol (mg/dL)   Date Value   03/24/2017 106    No flowsheet data found. Dilated Eye exam  Annually Data entered on: 2/8/2018   Last Dilated Eye Exam 1/27/2018     No flowsheet data found.             Template: GURVINDER

## 2018-05-07 NOTE — TELEPHONE ENCOUNTER
Patient has an appt tomorrow at [facility] for another eye injection.  Patient needs referral    Referral should be faxed to [phone]

## 2018-05-11 ENCOUNTER — TELEPHONE (OUTPATIENT)
Dept: FAMILY MEDICINE CLINIC | Facility: CLINIC | Age: 73
End: 2018-05-11

## 2018-05-11 NOTE — TELEPHONE ENCOUNTER
Referral for home health still pending  Spoke to patient and pt verbalized understanding.   Explained will let pt know once approved or not

## 2018-05-11 NOTE — TELEPHONE ENCOUNTER
Pt called states she was advised to call us if she had not heard from PT and pt states she has yet to hear from them.

## 2018-05-14 ENCOUNTER — TELEPHONE (OUTPATIENT)
Dept: FAMILY MEDICINE CLINIC | Facility: CLINIC | Age: 73
End: 2018-05-14

## 2018-05-14 NOTE — TELEPHONE ENCOUNTER
Dano from [facility] is calling on the status of orders that was sent over last week, Please call Elias at [phone]

## 2018-05-14 NOTE — TELEPHONE ENCOUNTER
Spoke to [facility] and left message for Syeda Max to call office back as was told that is the person to speak to regarding this issue.

## 2018-05-14 NOTE — TELEPHONE ENCOUNTER
Adalberto Burkett states that she sent over home health orders for in home PT for pt last week and she is still waiting for the orders? Asked if she could place those in the physician's portal and she states she does not have access to that.

## 2018-05-15 ENCOUNTER — LAB ENCOUNTER (OUTPATIENT)
Dept: LAB | Facility: HOSPITAL | Age: 73
End: 2018-05-15
Attending: FAMILY MEDICINE
Payer: MEDICARE

## 2018-05-15 DIAGNOSIS — Z12.11 SCREENING FOR MALIGNANT NEOPLASM OF COLON: ICD-10-CM

## 2018-05-15 PROCEDURE — 82272 OCCULT BLD FECES 1-3 TESTS: CPT

## 2018-05-15 NOTE — TELEPHONE ENCOUNTER
Emmanuel Rios stopped in today and it was discussed about this issue. She said that our office is \"web only\" so she does not know why these orders would of been faxed but she will look into this and let us know.

## 2018-05-16 ENCOUNTER — TELEPHONE (OUTPATIENT)
Dept: FAMILY MEDICINE CLINIC | Facility: CLINIC | Age: 73
End: 2018-05-16

## 2018-05-16 NOTE — TELEPHONE ENCOUNTER
The dx codes are on the referral that was sent over but [facility] just wanted to confirm if those were the primary dx codes and confirmed that yes they are.

## 2018-05-17 ENCOUNTER — TELEPHONE (OUTPATIENT)
Dept: FAMILY MEDICINE CLINIC | Facility: CLINIC | Age: 73
End: 2018-05-17

## 2018-05-17 NOTE — TELEPHONE ENCOUNTER
Order for home care services, PT for strengthening, gait training has been signed and successfully faxed to [facility] [phone].

## 2018-05-17 NOTE — TELEPHONE ENCOUNTER
Kathy from [facility] called and she said that the pt will probably need the RN service because she will need her blood pressure taken and she is a diabetic. Kathy can be reached at [phone].

## 2018-05-17 NOTE — TELEPHONE ENCOUNTER
Sharifa Sawyer from [facility] called and said they originally got an order for RN PT & OT and then received a 2nd order for just PT. What is the pt suppose to be having? Can Onelia be called back @ [phone]?

## 2018-05-17 NOTE — TELEPHONE ENCOUNTER
lmom for Onelia that the order placed in system is for RN, PT AND OT. And if she could also let Kathy know that skilled RN care is fine as it was also in the original order. Asked pt after reviewing message to call office with any questions.

## 2018-05-18 ENCOUNTER — HOSPITAL ENCOUNTER (OUTPATIENT)
Dept: ULTRASOUND IMAGING | Age: 73
Discharge: HOME | End: 2018-05-18
Attending: FAMILY MEDICINE
Payer: MEDICARE

## 2018-05-18 DIAGNOSIS — R60.0 BILATERAL LOWER EXTREMITY EDEMA: ICD-10-CM

## 2018-05-18 PROCEDURE — 93970 EXTREMITY STUDY: CPT | Performed by: FAMILY MEDICINE

## 2018-05-21 ENCOUNTER — TELEPHONE (OUTPATIENT)
Dept: FAMILY MEDICINE CLINIC | Facility: CLINIC | Age: 73
End: 2018-05-21

## 2018-05-21 NOTE — TELEPHONE ENCOUNTER
Nikolas Kapadia RN is calling from [place] [address] to inform Dr. Valentina Medley that today was the patient's last nurse visit. Guera Hagan states that the patient is doing well and understands how her medications should be taken.

## 2018-05-22 ENCOUNTER — TELEPHONE (OUTPATIENT)
Dept: FAMILY MEDICINE CLINIC | Facility: CLINIC | Age: 73
End: 2018-05-22

## 2018-05-22 NOTE — TELEPHONE ENCOUNTER
Sherri Baumann with [facility] called requesting US test results, please call [phone]

## 2018-05-22 NOTE — TELEPHONE ENCOUNTER
Jason Macias advised that Dr Juliann Curtis has not yet reviewed the US results  Jason Macias is calling to see if it is ok for patient to continue with PT. Brigette,can you please review the US report and advise?

## 2018-05-23 ENCOUNTER — TELEPHONE (OUTPATIENT)
Dept: FAMILY MEDICINE CLINIC | Facility: CLINIC | Age: 73
End: 2018-05-23

## 2018-05-23 NOTE — TELEPHONE ENCOUNTER
No DVT or significant reflux. Incidental finding of Baker's cyst bilateral left one has some calcification follow-up to discuss possible MRI.   Also incidental soft tissue density in the right groin possibilities include hematoma or lymph node follow-up to

## 2018-05-23 NOTE — TELEPHONE ENCOUNTER
----- Message from Cecilia Yusuf PA-C sent at 5/22/2018  9:49 PM CDT -----  No DVT or significant reflux. Incidental finding of Baker's cyst bilateral left one has some calcification follow-up to discuss possible MRI.   Also incidental soft tissue dens

## 2018-05-25 ENCOUNTER — TELEPHONE (OUTPATIENT)
Dept: FAMILY MEDICINE CLINIC | Facility: CLINIC | Age: 73
End: 2018-05-25

## 2018-05-25 DIAGNOSIS — E11.9 TYPE 2 DIABETES MELLITUS WITHOUT COMPLICATION, WITHOUT LONG-TERM CURRENT USE OF INSULIN (HCC): ICD-10-CM

## 2018-05-25 NOTE — TELEPHONE ENCOUNTER
----- Message from Cecilia Yusuf PA-C sent at 5/25/2018 11:37 AM CDT -----  Negative guaiac stool testing

## 2018-06-01 ENCOUNTER — OFFICE VISIT (OUTPATIENT)
Dept: FAMILY MEDICINE CLINIC | Facility: CLINIC | Age: 73
End: 2018-06-01

## 2018-06-01 VITALS
WEIGHT: 196 LBS | DIASTOLIC BLOOD PRESSURE: 64 MMHG | SYSTOLIC BLOOD PRESSURE: 118 MMHG | HEIGHT: 63.5 IN | BODY MASS INDEX: 34.3 KG/M2 | HEART RATE: 52 BPM

## 2018-06-01 DIAGNOSIS — M71.21 POPLITEAL CYST, RIGHT: ICD-10-CM

## 2018-06-01 DIAGNOSIS — R20.0 NUMBNESS AND TINGLING OF BOTH LOWER EXTREMITIES: Primary | ICD-10-CM

## 2018-06-01 DIAGNOSIS — M71.22 POPLITEAL CYST, LEFT: ICD-10-CM

## 2018-06-01 DIAGNOSIS — R20.2 NUMBNESS AND TINGLING OF BOTH LOWER EXTREMITIES: Primary | ICD-10-CM

## 2018-06-01 DIAGNOSIS — R19.09 MASS OF RIGHT INGUINAL REGION: ICD-10-CM

## 2018-06-01 DIAGNOSIS — I50.22 CHRONIC SYSTOLIC CONGESTIVE HEART FAILURE (HCC): ICD-10-CM

## 2018-06-01 DIAGNOSIS — I42.0 CARDIOMYOPATHY, DILATED (HCC): ICD-10-CM

## 2018-06-01 PROCEDURE — 99214 OFFICE O/P EST MOD 30 MIN: CPT | Performed by: FAMILY MEDICINE

## 2018-06-01 NOTE — PROGRESS NOTES
Spring Perez is a 67year old female. HPI:     Numbness and tingling:  Feet feel like she is walking on sponges x2 weeks. Swelling comes and goes of the feet and ankles. c/o numbness and tingling in both feet for approx a month.   Nothing seems to directed Disp: 1 Device Rfl: 0      Patient Active Problem List:     Dizziness     Severe obesity (BMI 35.0-39.9) (HCC)     Essential hypertension     Pacemaker     Hypokalemia     Iron deficiency anemia     CKD (chronic kidney disease) stage 3, GFR 30-59 Comment: LBBB BiV ICD [facility]    Social History:  Smoking status: Never Smoker                                                              Smokeless tobacco: Never Used                      Alcohol use:  No                     REVIEW OF SYSTEMS:   Review ultrasound was used to evaluate the bilateral lower extremity venous system. B-mode two-dimensional images of the vascular structures, Doppler spectral analysis, and color flow Doppler imaging were performed.     Patient was scanned in the upright position, bilateral Baker's cysts identified measuring 5.8 x 1.0 x 2.7 cm on the right and 4.8 x 1.5 x 3.0 cm on the left. The left sided Baker's cyst demonstrates a large calcification. This does not appear related to the popliteal artery.     Calcification within region  Consult general surgeon, Dr. Pablo Bennett, to consider biopsy.    - SURGERY - INTERNAL    3. Chronic systolic congestive heart failure [facility])  Patient to make appointment to follow-up with Dr. Lindsey Zhang.   Dr. Cervantes Sit progress note viewed, patient was to b

## 2018-06-02 PROBLEM — R20.0 NUMBNESS AND TINGLING OF BOTH LOWER EXTREMITIES: Status: ACTIVE | Noted: 2018-06-02

## 2018-06-02 PROBLEM — M71.21 POPLITEAL CYST, RIGHT: Status: ACTIVE | Noted: 2018-06-02

## 2018-06-02 PROBLEM — R19.09 MASS OF RIGHT INGUINAL REGION: Status: ACTIVE | Noted: 2018-06-02

## 2018-06-02 PROBLEM — M71.22 POPLITEAL CYST, LEFT: Status: ACTIVE | Noted: 2018-06-02

## 2018-06-02 PROBLEM — R20.2 NUMBNESS AND TINGLING OF BOTH LOWER EXTREMITIES: Status: ACTIVE | Noted: 2018-06-02

## 2018-06-02 NOTE — PATIENT INSTRUCTIONS
Treatment for Baker’s Cyst (Popliteal Cyst)  A Baker’s cyst (popliteal cyst) is a fluid-filled sac that forms behind the knee.   Types of treatment  You likely won’t need any treatment if you don’t have any symptoms from your Baker’s cyst. Some Baker’s cy If your cyst starts causing mild symptoms, plan to see your health care provider soon. See him or her right away if you have symptoms such as redness and swelling of your leg.  These symptoms may mean your Baker’s cyst has ruptured.      Date Last Reviewed: · Blockage of the popliteal artery. This causes pain and lack of blood flow to the leg. It can also be treated with arthrocentesis and steroid injections. · Compartment syndrome. This causes intense pain and problems moving the foot or toes.  Compartment s

## 2018-06-04 ENCOUNTER — TELEPHONE (OUTPATIENT)
Dept: FAMILY MEDICINE CLINIC | Facility: CLINIC | Age: 73
End: 2018-06-04

## 2018-06-04 NOTE — TELEPHONE ENCOUNTER
Julia from [facility] called and said she needs 1 more visit with pt so she can discharge her. She will need a verbal ok to D/C her home service and would like a call back @ [phone].

## 2018-06-04 NOTE — TELEPHONE ENCOUNTER
Pt called and said a form should have come over last week for Dr. Eve Mueller to sign for a wheelchair for her. She wants to know if she signed it and if so was it faxed back?

## 2018-06-04 NOTE — TELEPHONE ENCOUNTER
Amy Lopez called back and said she confirmed with pt and she is taking the furosemide 40mg daily and does have an appt with Dr. Sriram Connor on 6/20/18. Amy Lopez just needs to know if ok to D/C pt tomorrow from PT ?   Please advise

## 2018-06-04 NOTE — TELEPHONE ENCOUNTER
Please call and speak with the home health nurse. At patient's most recent office visit on June 1, 2018 patient told us that she was taking the furosemide 40 mg 1 tablet daily, however Dr. Colon Half note and prescription order is for twice a day.   Could t

## 2018-06-05 NOTE — TELEPHONE ENCOUNTER
Spoke to Andres and she said that it is not a wheelchair it is a seated walker and that the paperwork for that was faxed over to home health last week    Spoke to patient and explained this and pt verbalized understanding.   Can re-fax if the home health d

## 2018-06-05 NOTE — TELEPHONE ENCOUNTER
I do not recall seeing any documents for request for wheelchair. Please asked Andres if she gave her anything. Also, patient did not mention the need of a wheelchair to me.

## 2018-06-05 NOTE — TELEPHONE ENCOUNTER
Yes, okay to DC from PT. And clarify that patient is taking the furosemide twice a day not just once a day, thanks, Dr. Britt Diaz had prescribed it twice a day for her.

## 2018-06-05 NOTE — TELEPHONE ENCOUNTER
Spoke to Kendra and she will be discharging pt from nursing, OT and PT today. She will also discuss with patient that she needs to be taking the furosemide twice a day.

## 2018-06-07 ENCOUNTER — TELEPHONE (OUTPATIENT)
Dept: FAMILY MEDICINE CLINIC | Facility: CLINIC | Age: 73
End: 2018-06-07

## 2018-06-07 DIAGNOSIS — R29.898 WEAKNESS OF BOTH LOWER EXTREMITIES: ICD-10-CM

## 2018-06-07 DIAGNOSIS — R26.89 BALANCE PROBLEM: ICD-10-CM

## 2018-06-07 DIAGNOSIS — R26.81 GAIT INSTABILITY: Primary | ICD-10-CM

## 2018-06-07 NOTE — TELEPHONE ENCOUNTER
Charly Headley with [facility] [place] [id number] called she received the DME order for the rollator but when trying to process through insurance they required a referral from the PCP.  Charly Headley provided hicpic codes for referral. [id number], [id number],

## 2018-06-11 ENCOUNTER — LAB ENCOUNTER (OUTPATIENT)
Dept: LAB | Age: 73
End: 2018-06-11
Attending: FAMILY MEDICINE
Payer: MEDICARE

## 2018-06-11 DIAGNOSIS — E11.9 TYPE 2 DIABETES MELLITUS WITHOUT COMPLICATION, WITHOUT LONG-TERM CURRENT USE OF INSULIN (HCC): ICD-10-CM

## 2018-06-11 PROCEDURE — 83036 HEMOGLOBIN GLYCOSYLATED A1C: CPT

## 2018-06-11 PROCEDURE — 82570 ASSAY OF URINE CREATININE: CPT

## 2018-06-11 PROCEDURE — 82043 UR ALBUMIN QUANTITATIVE: CPT

## 2018-06-11 PROCEDURE — 36415 COLL VENOUS BLD VENIPUNCTURE: CPT

## 2018-06-11 PROCEDURE — 80053 COMPREHEN METABOLIC PANEL: CPT

## 2018-06-11 PROCEDURE — 85025 COMPLETE CBC W/AUTO DIFF WBC: CPT

## 2018-06-11 PROCEDURE — 84443 ASSAY THYROID STIM HORMONE: CPT

## 2018-06-11 PROCEDURE — 80061 LIPID PANEL: CPT

## 2018-06-11 PROCEDURE — 84439 ASSAY OF FREE THYROXINE: CPT

## 2018-06-25 ENCOUNTER — TELEPHONE (OUTPATIENT)
Dept: FAMILY MEDICINE CLINIC | Facility: CLINIC | Age: 73
End: 2018-06-25

## 2018-06-25 DIAGNOSIS — M71.21 POPLITEAL CYST, RIGHT: ICD-10-CM

## 2018-06-25 DIAGNOSIS — M71.22 POPLITEAL CYST, LEFT: Primary | ICD-10-CM

## 2018-06-25 DIAGNOSIS — E11.22 DIABETES MELLITUS WITH STAGE 3 CHRONIC KIDNEY DISEASE (HCC): ICD-10-CM

## 2018-06-25 DIAGNOSIS — N18.30 DIABETES MELLITUS WITH STAGE 3 CHRONIC KIDNEY DISEASE (HCC): ICD-10-CM

## 2018-06-25 NOTE — TELEPHONE ENCOUNTER
Pt called in and states that ever since she had that ultrasound done 5/18/18 and they found those cysts to back of her knee(s) she has been having a burning sensation that goes from the knees down to her toes and she feels like she is \"walking on balloons

## 2018-06-25 NOTE — TELEPHONE ENCOUNTER
Spoke to patient and she states she is getting short of breath when she walks in her apartment. She said she that she saw Dr. Khushbu Lopez on Friday 6/22/18. Which after looking in chart she saw him on Wed. 6/20.   Pt states the shortness of breath just start

## 2018-06-25 NOTE — TELEPHONE ENCOUNTER
Please call patient:  Asked patient if she is having increased shortness of breath or difficulty breathing along with the swelling in her legs, if so she needs to see the cardiologist.  We can refer patient to the orthopedic specialist, Dr. Paul Alejandre, for the

## 2018-06-27 ENCOUNTER — TELEPHONE (OUTPATIENT)
Dept: FAMILY MEDICINE CLINIC | Facility: CLINIC | Age: 73
End: 2018-06-27

## 2018-06-27 NOTE — TELEPHONE ENCOUNTER
----- Message from Chelsea Segundo DO sent at 6/26/2018  8:34 PM CDT -----  Please call patient:  Diabetes is stable. However, due to decreased kidney function, recommend patient discontinue the metformin.   Please instruct patient to stop taking the metf

## 2018-07-02 ENCOUNTER — TELEPHONE (OUTPATIENT)
Dept: FAMILY MEDICINE CLINIC | Facility: CLINIC | Age: 73
End: 2018-07-02

## 2018-07-02 ENCOUNTER — OFFICE VISIT (OUTPATIENT)
Dept: FAMILY MEDICINE CLINIC | Facility: CLINIC | Age: 73
End: 2018-07-02

## 2018-07-02 VITALS
HEART RATE: 69 BPM | DIASTOLIC BLOOD PRESSURE: 38 MMHG | SYSTOLIC BLOOD PRESSURE: 77 MMHG | BODY MASS INDEX: 33.31 KG/M2 | HEIGHT: 63.5 IN | WEIGHT: 190.38 LBS

## 2018-07-02 DIAGNOSIS — H25.13 AGE-RELATED NUCLEAR CATARACT, BILATERAL: ICD-10-CM

## 2018-07-02 DIAGNOSIS — N18.30 DIABETES MELLITUS WITH STAGE 3 CHRONIC KIDNEY DISEASE (HCC): Primary | ICD-10-CM

## 2018-07-02 DIAGNOSIS — H35.3221 EXUDATIVE AGE-RELATED MACULAR DEGENERATION OF LEFT EYE WITH ACTIVE CHOROIDAL NEOVASCULARIZATION (HCC): Primary | ICD-10-CM

## 2018-07-02 DIAGNOSIS — E11.22 DIABETES MELLITUS WITH STAGE 3 CHRONIC KIDNEY DISEASE (HCC): Primary | ICD-10-CM

## 2018-07-02 DIAGNOSIS — L30.9 DERMATITIS: ICD-10-CM

## 2018-07-02 DIAGNOSIS — Z76.89 ENCOUNTER FOR NEW MEDICATION PRESCRIPTION: ICD-10-CM

## 2018-07-02 PROCEDURE — 99214 OFFICE O/P EST MOD 30 MIN: CPT | Performed by: FAMILY MEDICINE

## 2018-07-02 RX ORDER — ROSUVASTATIN CALCIUM 5 MG/1
5 TABLET, COATED ORAL NIGHTLY
Qty: 90 TABLET | Refills: 1 | Status: SHIPPED | OUTPATIENT
Start: 2018-07-02 | End: 2018-07-04

## 2018-07-02 NOTE — PROGRESS NOTES
HPI:   Leon Goldman is a 67year old female    Patient presents with her daughter who is pleasant and supportive. DMII:  Diagnosed with Diabetes about 4 years ago. Has been controlled. Patient taking metformin.   Patient with chronic kidney dise Cardiomyopathy, dilated ([facility] 3/17/16   • Chronic systolic congestive heart failure ([facility] 6/21/2017   • CKD (chronic kidney disease) stage 3, GFR 30-59 ml/min ([facility]) 4/4/2017   • Congestive heart disease ([facility]    • Diabetes mellitus with stage 3 chronic kid developed, well nourished, in no apparent distress, pleasant elderly -American female  NECK: supple,no adenopathy  Skin: Scant fine papular rash of dorsal arms.   LUNGS: clear to auscultation  CARDIO: RRR without murmur  GI: NBS, no masses, HSM or te satisfaction.    - Linagliptin (TRADJENTA) 5 MG Oral Tab; Take 5 mg by mouth every morning. Dispense: 90 tablet; Refill: 1    3. Dermatitis  Mild. Recommend gentle skin care. Okay to use OTC topical steroid.         Orders Placed This Encounter      Hem

## 2018-07-02 NOTE — PATIENT INSTRUCTIONS
Chronic Kidney Disease (CKD)     The role of the kidneys is to remove waste products and extra water from the blood.  When the kidneys do not work as they should, waste products begin to build up in the blood. This is called chronic kidney disease (CKD). · If you smoke, you must quit. Smoking makes kidney disease worse. Talk with your healthcare provider about ways to help you quit.  For more information, visit the following links:  ¨ www.smokefree.gov/sites/default/files/pdf/clearing-the-air-accessible. pd · Chest pain or shortness of breath  · Heart beating fast, slow, or irregularly  When to seek medical advice  Call your healthcare provider right away if any of these occur:  · Nausea or vomiting  · Fever of 100.4°F (38°C) or higher, or as directed by your

## 2018-07-02 NOTE — TELEPHONE ENCOUNTER
Pt informed that referral will be put in the system however she would need to contact Dr. Jacob Peralta office regarding injections and scheduling cataract surgery. Pt verbalized understanding and had no questions at this time.

## 2018-07-02 NOTE — TELEPHONE ENCOUNTER
Please inform patient that we can put in a referral for her to see the ophthalmologist for cataract surgery once she has completed the injections.

## 2018-07-02 NOTE — TELEPHONE ENCOUNTER
Shravan Dave is calling to request a referral be placed for her to have cataract surgery, I spoke with Dr. Samuel Ambrose office and they stated that the patient can have cataract surgery completed with their office however she needs to have the macular degeneration pr

## 2018-07-03 ENCOUNTER — TELEPHONE (OUTPATIENT)
Dept: FAMILY MEDICINE CLINIC | Facility: CLINIC | Age: 73
End: 2018-07-03

## 2018-07-03 DIAGNOSIS — N18.30 DIABETES MELLITUS WITH STAGE 3 CHRONIC KIDNEY DISEASE (HCC): ICD-10-CM

## 2018-07-03 DIAGNOSIS — Z76.89 ENCOUNTER FOR NEW MEDICATION PRESCRIPTION: ICD-10-CM

## 2018-07-03 DIAGNOSIS — E11.22 DIABETES MELLITUS WITH STAGE 3 CHRONIC KIDNEY DISEASE (HCC): ICD-10-CM

## 2018-07-03 NOTE — TELEPHONE ENCOUNTER
Pt called with medication info wanted Dr Marline Garza to know that pt takes Simvastatin 10MG for her cholesterol, states we did not have that info on file and Dr. Marline Garza requested at last OV pt call us with info

## 2018-07-04 PROBLEM — L30.9 DERMATITIS: Status: ACTIVE | Noted: 2018-07-04

## 2018-07-04 NOTE — TELEPHONE ENCOUNTER
Please see note below. Please instruct patient to not take the rosuvastatin that was prescribed since she is already on simvastatin.

## 2018-07-05 RX ORDER — SIMVASTATIN 10 MG
10 TABLET ORAL DAILY
Qty: 30 TABLET | Refills: 0 | COMMUNITY
Start: 2018-07-05 | End: 2018-07-06 | Stop reason: ALTCHOICE

## 2018-07-05 NOTE — TELEPHONE ENCOUNTER
Simvastatin added to patient's med list  Left message for patient that she should not take the crestor that was prescribed for her since she is already taking simvastatin  Asked patient to call back if she has any questions

## 2018-07-06 RX ORDER — ROSUVASTATIN CALCIUM 5 MG/1
5 TABLET, COATED ORAL NIGHTLY
Qty: 90 TABLET | Refills: 1 | OUTPATIENT
Start: 2018-07-06 | End: 2018-09-23

## 2018-07-06 NOTE — TELEPHONE ENCOUNTER
Received a call from [facility] regarding the patient's use of simvastatin. Per pharmacy patient has gotten 2 rxs for the simvastatin. One was on 10/21/2016 for qty 90 and the second was on 8/24/17 qty 90. Patient not taking simvastatin on a regular basis.    P

## 2018-07-12 ENCOUNTER — TELEPHONE (OUTPATIENT)
Dept: FAMILY MEDICINE CLINIC | Facility: CLINIC | Age: 73
End: 2018-07-12

## 2018-07-12 DIAGNOSIS — R29.898 WEAKNESS OF BOTH LOWER EXTREMITIES: Primary | ICD-10-CM

## 2018-07-12 DIAGNOSIS — R20.0 NUMBNESS AND TINGLING OF BOTH LOWER EXTREMITIES: ICD-10-CM

## 2018-07-12 DIAGNOSIS — R20.2 NUMBNESS AND TINGLING OF BOTH LOWER EXTREMITIES: ICD-10-CM

## 2018-07-12 NOTE — TELEPHONE ENCOUNTER
Pt informed of Dr. Curtis Villa recommendations, pt verbalized understanding and had no questions at this time.

## 2018-07-12 NOTE — TELEPHONE ENCOUNTER
Michael for patient to call the office back for condition update. Pt was seen in [facility] this morning, er physician called and spoke with Dr. Cinthia Irving regarding patient. At Dr. Cinthia Irving request, x-ray lumbar spine ordered .

## 2018-07-17 ENCOUNTER — TELEPHONE (OUTPATIENT)
Dept: FAMILY MEDICINE CLINIC | Facility: CLINIC | Age: 73
End: 2018-07-17

## 2018-07-17 DIAGNOSIS — N18.30 CKD (CHRONIC KIDNEY DISEASE) STAGE 3, GFR 30-59 ML/MIN (HCC): Primary | ICD-10-CM

## 2018-07-17 PROBLEM — M17.0 PRIMARY OSTEOARTHRITIS OF BOTH KNEES: Status: ACTIVE | Noted: 2018-07-17

## 2018-07-17 NOTE — TELEPHONE ENCOUNTER
Pt states she seen Ortho got injection in the knee and still has bad swelling and is wondering if she can get an order for an xray of the kidneys she already has an appointment for xray of the back on Thursday and would like to get it all done at once, pt

## 2018-07-18 NOTE — TELEPHONE ENCOUNTER
Per Dr Cate Reyna kidney function is stable and she does not need an xray. However, she was referred to nephrology and patient never went. Dr Abhishek Castanon would like patient referred to Dr Deonte Smith.   Patient given contact info for Dr Deonte Smith

## 2018-07-19 ENCOUNTER — HOSPITAL ENCOUNTER (OUTPATIENT)
Dept: GENERAL RADIOLOGY | Age: 73
Discharge: HOME | End: 2018-07-19
Attending: FAMILY MEDICINE
Payer: MEDICARE

## 2018-07-19 DIAGNOSIS — R20.2 NUMBNESS AND TINGLING OF BOTH LOWER EXTREMITIES: ICD-10-CM

## 2018-07-19 DIAGNOSIS — R20.0 NUMBNESS AND TINGLING OF BOTH LOWER EXTREMITIES: ICD-10-CM

## 2018-07-19 DIAGNOSIS — R29.898 WEAKNESS OF BOTH LOWER EXTREMITIES: ICD-10-CM

## 2018-07-19 PROCEDURE — 72110 X-RAY EXAM L-2 SPINE 4/>VWS: CPT | Performed by: FAMILY MEDICINE

## 2018-07-30 ENCOUNTER — TELEPHONE (OUTPATIENT)
Dept: FAMILY MEDICINE CLINIC | Facility: CLINIC | Age: 73
End: 2018-07-30

## 2018-07-30 NOTE — TELEPHONE ENCOUNTER
----- Message from Isabelle Guthrie DO sent at 7/29/2018  5:40 PM CDT -----  Please call patient: Please sure to speak to patient directly, I see that she canceled her appointment with the neurologist and with myself.   Also, has not made an appointment to

## 2018-08-02 NOTE — TELEPHONE ENCOUNTER
Pt informed of test results and recommendations. Pt verbalized understanding and had no questions at this time.   Pt states that she will call to schedule her appt for the MRI and then call back to schedule an office visit with Dr. Saurabh Rodriguez and Dr. Payton Kim

## 2018-08-29 ENCOUNTER — OFFICE VISIT (OUTPATIENT)
Dept: FAMILY MEDICINE CLINIC | Facility: CLINIC | Age: 73
End: 2018-08-29
Payer: MEDICAID

## 2018-08-29 ENCOUNTER — OFFICE VISIT (OUTPATIENT)
Dept: NEUROLOGY | Facility: CLINIC | Age: 73
End: 2018-08-29
Payer: MEDICAID

## 2018-08-29 VITALS
WEIGHT: 190.81 LBS | SYSTOLIC BLOOD PRESSURE: 90 MMHG | HEIGHT: 63.5 IN | DIASTOLIC BLOOD PRESSURE: 52 MMHG | BODY MASS INDEX: 33.39 KG/M2 | RESPIRATION RATE: 16 BRPM | HEART RATE: 76 BPM

## 2018-08-29 VITALS — RESPIRATION RATE: 16 BRPM | DIASTOLIC BLOOD PRESSURE: 62 MMHG | HEART RATE: 64 BPM | SYSTOLIC BLOOD PRESSURE: 98 MMHG

## 2018-08-29 DIAGNOSIS — R20.0 NUMBNESS IN FEET: Primary | ICD-10-CM

## 2018-08-29 DIAGNOSIS — R20.2 PARESTHESIAS: ICD-10-CM

## 2018-08-29 DIAGNOSIS — R20.0 NUMBNESS AND TINGLING OF BOTH LOWER EXTREMITIES: ICD-10-CM

## 2018-08-29 DIAGNOSIS — R20.2 NUMBNESS AND TINGLING OF BOTH LOWER EXTREMITIES: ICD-10-CM

## 2018-08-29 DIAGNOSIS — R20.0 NUMBNESS AND TINGLING IN LEFT HAND: ICD-10-CM

## 2018-08-29 DIAGNOSIS — G62.9 POLYNEUROPATHY: ICD-10-CM

## 2018-08-29 DIAGNOSIS — M47.816 LUMBAR FACET ARTHROPATHY: Primary | ICD-10-CM

## 2018-08-29 DIAGNOSIS — R60.0 BILATERAL LOWER EXTREMITY EDEMA: ICD-10-CM

## 2018-08-29 DIAGNOSIS — M43.10 ANTEROLISTHESIS: ICD-10-CM

## 2018-08-29 DIAGNOSIS — H35.3221 EXUDATIVE AGE-RELATED MACULAR DEGENERATION OF LEFT EYE WITH ACTIVE CHOROIDAL NEOVASCULARIZATION (HCC): ICD-10-CM

## 2018-08-29 DIAGNOSIS — N18.30 STAGE 3 CHRONIC KIDNEY DISEASE (HCC): ICD-10-CM

## 2018-08-29 DIAGNOSIS — R27.0 ATAXIA: ICD-10-CM

## 2018-08-29 DIAGNOSIS — H35.3112 INTERMEDIATE STAGE NONEXUDATIVE AGE-RELATED MACULAR DEGENERATION OF RIGHT EYE: ICD-10-CM

## 2018-08-29 DIAGNOSIS — H25.9 AGE-RELATED CATARACT OF BOTH EYES, UNSPECIFIED AGE-RELATED CATARACT TYPE: ICD-10-CM

## 2018-08-29 DIAGNOSIS — R20.2 NUMBNESS AND TINGLING IN LEFT HAND: ICD-10-CM

## 2018-08-29 PROCEDURE — 99214 OFFICE O/P EST MOD 30 MIN: CPT | Performed by: FAMILY MEDICINE

## 2018-08-29 PROCEDURE — 99204 OFFICE O/P NEW MOD 45 MIN: CPT | Performed by: OTHER

## 2018-08-29 RX ORDER — GABAPENTIN 100 MG/1
CAPSULE ORAL
Qty: 270 CAPSULE | Refills: 1 | Status: SHIPPED | OUTPATIENT
Start: 2018-08-29 | End: 2019-01-15

## 2018-08-29 NOTE — PATIENT INSTRUCTIONS
Refill policies:    • Allow 2-3 business days for refills; controlled substances may take longer.   • Contact your pharmacy at least 5 days prior to running out of medication and have them send an electronic request or submit request through the “request re entire amount billed. Precertification and Prior Authorizations: If your physician has recommended that you have a procedure or additional testing performed.   [facility]) will contact your insurance carrier to obtain pre-certi pharmacy should also send any requests electronically to the [facility] office. Electromyography and Nerve Conduction Studies- Call [phone] option #1 to schedule.      There are things that will interfere with the performance or affect the results

## 2018-08-29 NOTE — PROGRESS NOTES
[facility]   Neurology- INITIAL CLINIC VISIT  2018, 9:50 AM     Marjorie Serrano Patient Status:  No patient class for patient encounter    1945 MRN [id number]   [facility] right inguinal region 6/2/2018   • Numbness and tingling of both lower extremities 6/2/2018   • Popliteal cyst, left 6/2/2018   • Popliteal cyst, right 6/2/2018   • Severe obesity (BMI 35.0-39.9) 3/17/2017   • Type 2 diabetes mellitus without complication, Oral Tab CR, Take 1 tablet (20 mEq total) by mouth once daily. (Patient taking differently: Take 20 mEq by mouth once daily. Pt cut pill in half ), Disp: 90 tablet, Rfl: 3  •  hydrochlorothiazide 25 MG Oral Tab, Take 1 tablet (25 mg total) by mouth daily. , 4th and 5th digit. Vibratory perception showed decreased in L toe (4 sec) and absent in R toe (present at MCP_ proprioception were intact at the toes. Romberg sign was absent. Complex motor skills revealed normal coordination. Finger-nose-finger intact.

## 2018-08-29 NOTE — PROGRESS NOTES
Kathrin Rodriguez is a 68year old female. HPI:       Chronic low back pain/lumbar facet arthropathy/numbness and tingling of lower extremities:  Chronic intermittent low back pain. Some days more constant than others. Pain up to 5-6 out of 10.   Pain not apply Device Use as directed Disp: 1 Device Rfl: 0   gabapentin 100 MG Oral Cap Day 1-5- take 1 cap at night, Day 6-10, take 1 cap twice a day, then increase to 1 cap three times a day Disp: 270 capsule Rfl: 1      Patient Active Problem List:     Jose Rafael Acosta right inguinal region 6/2/2018   • Numbness and tingling of both lower extremities 6/2/2018   • Popliteal cyst, left 6/2/2018   • Popliteal cyst, right 6/2/2018   • Severe obesity (BMI 35.0-39.9) 3/17/2017   • Type 2 diabetes mellitus without complication, Cardiovascular: Normal rate and regular rhythm. No murmur heard. Edema (1+ or less bilateral pretibial pitting edema long term up) present. Pulmonary/Chest: Effort normal and breath sounds normal. She has no wheezes. She has no rales.    Neurological: Sh eye  Age-related cataract of both eyes, unspecified age-related cataract type      1. Lumbar facet arthropathy (HCC)  New diagnosis. Check MRI of lumbar spine. Consider consultation with Physiatrist in the near future.   Check MRI of lumbar spine.    - MR in about 6 weeks (around 10/10/2018) for Diabetes after recheck of blood tests.

## 2018-08-29 NOTE — PROGRESS NOTES
Patient here for evaluation of tingling in both legs from knees down and numbness in small and ring finger on left hand.

## 2018-08-29 NOTE — PATIENT INSTRUCTIONS
Blood work is due around 10/2/2018, please fast for 10 hours, but drink plenty of water and ok to take medications.

## 2018-09-05 PROBLEM — H35.3221 EXUDATIVE AGE-RELATED MACULAR DEGENERATION OF LEFT EYE WITH ACTIVE CHOROIDAL NEOVASCULARIZATION (HCC): Status: ACTIVE | Noted: 2018-09-05

## 2018-09-05 PROBLEM — M47.816 LUMBAR FACET ARTHROPATHY: Status: ACTIVE | Noted: 2018-09-05

## 2018-09-10 ENCOUNTER — TELEPHONE (OUTPATIENT)
Dept: NEPHROLOGY | Facility: CLINIC | Age: 73
End: 2018-09-10

## 2018-09-10 NOTE — TELEPHONE ENCOUNTER
Please call patient:  Please let patient know that Dr. Alyssa Hdz was informed of her missing her appointment with Dr. Emory Bryson the kidney specialist.  I am concerned about her memory that she forgot that she made the appointment to see Dr. Emory Bryson.   Please ask casandra

## 2018-09-10 NOTE — TELEPHONE ENCOUNTER
Spoke to patient and she states that she thinks that someone from our office made the appt-Alejandra possibly but discussed that if that was the case that she would of given that information to patient at time of her last visit.  Pt states \"well maybe Kesha

## 2018-09-10 NOTE — TELEPHONE ENCOUNTER
Pt told ELOINA she wanted to reschedule new pt appt with Dr Alex Mckeon. I called her to reschedule. She wasn't aware of an appt today and asked me Piter Montilla is Dr Alex Mckeon. \"  I told her he was a kidney doctor that Dr Katrina Nolasco wanted her to see.   She said that Dr Xavier Olmos

## 2018-09-12 ENCOUNTER — PATIENT OUTREACH (OUTPATIENT)
Dept: FAMILY MEDICINE CLINIC | Facility: CLINIC | Age: 73
End: 2018-09-12

## 2018-09-14 ENCOUNTER — MED REC SCAN ONLY (OUTPATIENT)
Dept: FAMILY MEDICINE CLINIC | Facility: CLINIC | Age: 73
End: 2018-09-14

## 2018-09-23 DIAGNOSIS — N18.30 DIABETES MELLITUS WITH STAGE 3 CHRONIC KIDNEY DISEASE (HCC): ICD-10-CM

## 2018-09-23 DIAGNOSIS — E11.22 DIABETES MELLITUS WITH STAGE 3 CHRONIC KIDNEY DISEASE (HCC): ICD-10-CM

## 2018-09-23 DIAGNOSIS — Z76.89 ENCOUNTER FOR NEW MEDICATION PRESCRIPTION: ICD-10-CM

## 2018-09-24 RX ORDER — ROSUVASTATIN CALCIUM 5 MG/1
TABLET, COATED ORAL
Qty: 90 TABLET | Refills: 0 | Status: SHIPPED | OUTPATIENT
Start: 2018-09-24 | End: 2018-11-09

## 2018-09-24 RX ORDER — LINAGLIPTIN 5 MG/1
TABLET, FILM COATED ORAL
Qty: 90 TABLET | Refills: 0 | Status: SHIPPED | OUTPATIENT
Start: 2018-09-24 | End: 2018-12-27

## 2018-10-12 ENCOUNTER — OFFICE VISIT (OUTPATIENT)
Dept: NEPHROLOGY | Facility: CLINIC | Age: 73
End: 2018-10-12
Payer: MEDICAID

## 2018-10-12 VITALS — WEIGHT: 197 LBS | SYSTOLIC BLOOD PRESSURE: 110 MMHG | DIASTOLIC BLOOD PRESSURE: 84 MMHG | BODY MASS INDEX: 34 KG/M2

## 2018-10-12 DIAGNOSIS — I51.89 DIASTOLIC DYSFUNCTION: ICD-10-CM

## 2018-10-12 DIAGNOSIS — I10 ESSENTIAL HYPERTENSION: ICD-10-CM

## 2018-10-12 DIAGNOSIS — N18.30 CKD (CHRONIC KIDNEY DISEASE) STAGE 3, GFR 30-59 ML/MIN (HCC): Primary | ICD-10-CM

## 2018-10-12 DIAGNOSIS — R60.9 EDEMA, UNSPECIFIED TYPE: ICD-10-CM

## 2018-10-12 PROCEDURE — 99204 OFFICE O/P NEW MOD 45 MIN: CPT | Performed by: INTERNAL MEDICINE

## 2018-10-12 NOTE — PROGRESS NOTES
Nephrology Consult Note    REASON FOR CONSULT: CKD 3    ASSESSMENT/PLAN:        1) CKD 3- elevated serum creatinine of 1.6 mg/dL reflects a combination of hypertensive and age-related nephrosclerosis combined with medication effect (thiazide + loop diureti years and hypertensive but denies any history of acute or chronic renal failure gross microscopic hematuria proteinuria kidney stones or infections. For further details, please see above.     ROS:    Denies fever/chills  Denies wt loss/gain  Denies HA or v Outpatient Medications:  HYDROCHLOROTHIAZIDE 25 MG Oral Tab TAKE 1 TABLET EVERY DAY Disp: 90 tablet Rfl: 3   FUROSEMIDE 40 MG Oral Tab TAKE 1 TABLET TWICE DAILY Disp: 180 tablet Rfl: 3   TRADJENTA 5 MG Oral Tab TAKE 1 TABLET EVERY MORNING Disp: 90 tablet R - inability: Not on file      Transportation needs - medical: Not on file      Transportation needs - non-medical: Not on file    Occupational History      Not on file    Tobacco Use      Smoking status: Never Smoker      Smokeless tobacco: Never Used    S

## 2018-10-30 ENCOUNTER — PATIENT OUTREACH (OUTPATIENT)
Dept: CASE MANAGEMENT | Age: 73
End: 2018-10-30

## 2018-11-06 ENCOUNTER — PATIENT OUTREACH (OUTPATIENT)
Dept: CASE MANAGEMENT | Age: 73
End: 2018-11-06

## 2018-11-09 ENCOUNTER — PATIENT OUTREACH (OUTPATIENT)
Dept: CASE MANAGEMENT | Age: 73
End: 2018-11-09

## 2018-11-09 DIAGNOSIS — N18.30 DIABETES MELLITUS WITH STAGE 3 CHRONIC KIDNEY DISEASE (HCC): ICD-10-CM

## 2018-11-09 DIAGNOSIS — N18.30 CKD (CHRONIC KIDNEY DISEASE) STAGE 3, GFR 30-59 ML/MIN (HCC): ICD-10-CM

## 2018-11-09 DIAGNOSIS — I50.22 CHRONIC SYSTOLIC CONGESTIVE HEART FAILURE (HCC): ICD-10-CM

## 2018-11-09 DIAGNOSIS — E11.22 DIABETES MELLITUS WITH STAGE 3 CHRONIC KIDNEY DISEASE (HCC): ICD-10-CM

## 2018-11-09 NOTE — PROGRESS NOTES
I want to know a little about you. • What do you do for fun? Used to go bowling with family and friends 2x/month   • Any hobbies? What Hobbies? Reads the bible and sews  • What do you do for work?  Retired, was a nurses aid for 35 years    Social support: keeping with this diet? No  o Can you tell me why you think you were put on this diet?  is diabetic so they eat the same foods  o Would you like more info No  o What are concerns or things that keep you from following this diet?  None  o Do you want you meet your needs.     Care manager f/up:  Work on for next time: Cut back on carbs and ice cream  Resources needed: No  Care gaps? :  No    Spoke to Magda padilla at length about CCM, current care plan and performed CCM assessment with Magda padilla reviewed meds and

## 2018-11-30 PROCEDURE — 99490 CHRNC CARE MGMT STAFF 1ST 20: CPT

## 2018-12-04 ENCOUNTER — PATIENT OUTREACH (OUTPATIENT)
Dept: CASE MANAGEMENT | Age: 73
End: 2018-12-04

## 2018-12-04 ENCOUNTER — TELEPHONE (OUTPATIENT)
Dept: FAMILY MEDICINE CLINIC | Facility: CLINIC | Age: 73
End: 2018-12-04

## 2018-12-04 DIAGNOSIS — E11.22 DIABETES MELLITUS WITH STAGE 3 CHRONIC KIDNEY DISEASE (HCC): ICD-10-CM

## 2018-12-04 DIAGNOSIS — I10 ESSENTIAL HYPERTENSION: ICD-10-CM

## 2018-12-04 DIAGNOSIS — M17.0 PRIMARY OSTEOARTHRITIS OF BOTH KNEES: ICD-10-CM

## 2018-12-04 DIAGNOSIS — N18.30 DIABETES MELLITUS WITH STAGE 3 CHRONIC KIDNEY DISEASE (HCC): ICD-10-CM

## 2018-12-04 NOTE — PROGRESS NOTES
12/4/2018  Spoke to Magda padilla for CCM.       Updates to patient care team/ comments: None  Patient reported changes in medications: None  Med Adherence  Comment: Taking as directed     Health Maintenance: Due for flu shot, mammogram, DEXA scan, and colonoscopy care plan established by Alina Araujo DO, need for CCM determined from these assessments and data.

## 2018-12-04 NOTE — TELEPHONE ENCOUNTER
While speaking with pt for monthly CCM, pt stated she feels like she is walking on balloons and is very unsteady. Pt stated she doesn't go outside because of this. Pt is in need of a flu shot and to discuss health maintenance with Dr. Humza Lira.     Please

## 2018-12-27 ENCOUNTER — OFFICE VISIT (OUTPATIENT)
Dept: FAMILY MEDICINE CLINIC | Facility: CLINIC | Age: 73
End: 2018-12-27
Payer: MEDICAID

## 2018-12-27 ENCOUNTER — APPOINTMENT (OUTPATIENT)
Dept: LAB | Age: 73
End: 2018-12-27
Attending: FAMILY MEDICINE
Payer: MEDICARE

## 2018-12-27 VITALS
WEIGHT: 210 LBS | HEART RATE: 64 BPM | DIASTOLIC BLOOD PRESSURE: 70 MMHG | SYSTOLIC BLOOD PRESSURE: 106 MMHG | BODY MASS INDEX: 37 KG/M2

## 2018-12-27 DIAGNOSIS — R60.0 BILATERAL LOWER EXTREMITY EDEMA: ICD-10-CM

## 2018-12-27 DIAGNOSIS — R20.0 NUMBNESS AND TINGLING OF BOTH LOWER EXTREMITIES: ICD-10-CM

## 2018-12-27 DIAGNOSIS — R26.89 BALANCE PROBLEM: ICD-10-CM

## 2018-12-27 DIAGNOSIS — R26.81 GAIT INSTABILITY: ICD-10-CM

## 2018-12-27 DIAGNOSIS — R29.898 WEAKNESS OF BOTH LOWER EXTREMITIES: ICD-10-CM

## 2018-12-27 DIAGNOSIS — R20.2 NUMBNESS AND TINGLING OF BOTH LOWER EXTREMITIES: ICD-10-CM

## 2018-12-27 DIAGNOSIS — Z23 NEED FOR VACCINATION: ICD-10-CM

## 2018-12-27 DIAGNOSIS — R27.0 ATAXIA: ICD-10-CM

## 2018-12-27 DIAGNOSIS — Z79.899 ENCOUNTER FOR LONG-TERM CURRENT USE OF MEDICATION: ICD-10-CM

## 2018-12-27 DIAGNOSIS — E11.22 DIABETES MELLITUS WITH STAGE 3 CHRONIC KIDNEY DISEASE (HCC): Primary | ICD-10-CM

## 2018-12-27 DIAGNOSIS — N18.30 DIABETES MELLITUS WITH STAGE 3 CHRONIC KIDNEY DISEASE (HCC): Primary | ICD-10-CM

## 2018-12-27 DIAGNOSIS — E11.22 DIABETES MELLITUS WITH STAGE 3 CHRONIC KIDNEY DISEASE (HCC): ICD-10-CM

## 2018-12-27 DIAGNOSIS — R20.0 NUMBNESS OF FEET: ICD-10-CM

## 2018-12-27 DIAGNOSIS — N18.30 DIABETES MELLITUS WITH STAGE 3 CHRONIC KIDNEY DISEASE (HCC): ICD-10-CM

## 2018-12-27 DIAGNOSIS — G62.9 POLYNEUROPATHY: ICD-10-CM

## 2018-12-27 PROCEDURE — G0008 ADMIN INFLUENZA VIRUS VAC: HCPCS | Performed by: FAMILY MEDICINE

## 2018-12-27 PROCEDURE — 83036 HEMOGLOBIN GLYCOSYLATED A1C: CPT

## 2018-12-27 PROCEDURE — 80061 LIPID PANEL: CPT

## 2018-12-27 PROCEDURE — 90653 IIV ADJUVANT VACCINE IM: CPT | Performed by: FAMILY MEDICINE

## 2018-12-27 PROCEDURE — 99215 OFFICE O/P EST HI 40 MIN: CPT | Performed by: FAMILY MEDICINE

## 2018-12-27 PROCEDURE — 36415 COLL VENOUS BLD VENIPUNCTURE: CPT

## 2018-12-27 PROCEDURE — 80053 COMPREHEN METABOLIC PANEL: CPT

## 2018-12-27 NOTE — PATIENT INSTRUCTIONS
Please make appointment to see Dr. Barbara Barry, neurologist, for follow up.       Chronic Kidney Disease (CKD)     The role of the kidneys is to remove waste products and extra water from the blood.  When the kidneys do not work as they should, w worse. Talk with your healthcare provider about ways to help you quit.  For more information, visit the following links:  ? www.smokefree.gov/sites/default/files/pdf/clearing-the-air-accessible. pdf  ? www.smokefree. gov  ? www.cancer. org/healthy/stayawayfro irregularly  When to seek medical advice  Call your healthcare provider right away if any of these occur:  · Nausea or vomiting  · Fever of 100.4°F (38°C) or higher, or as directed by your healthcare provider  · Unexpected weight gain or swelling in the le

## 2018-12-27 NOTE — PROGRESS NOTES
HPI:   Lonny Waterman is a 68year old female    Type 2 diabetes:  Complication of chronic kidney disease and polyneuropathy. Metformin was discontinued in the recent past secondary to chronic kidney disease.   Patient most recently has been taking Tr apply route daily. Disp: 2 each Rfl: 1   gabapentin 100 MG Oral Cap Day 1-5- take 1 cap at night, Day 6-10, take 1 cap twice a day, then increase to 1 cap three times a day (Patient taking differently: 100 mg 3 (three) times daily.  Day 1-5- take 1 cap at n • ANGIOGRAM  3/12/2015    50% proximal LAD, 50% Mid LAD, mild MR    • OTHER SURGICAL HISTORY      fatty tissue removed from back    • PACEMAKER/DEFIBRILLATOR  11/16/2015    LBBB Bi[facility] ICD [facility]       Social History: Social History    Tobacco Use      Smo 144 mmol/L 142     Potassium      3.6 - 5.1 mmol/L 3.1 (L)     Chloride      101 - 111 mmol/L 104     Carbon Dioxide, Total      22.0 - 32.0 mmol/L 32.0     CHOLESTEROL, TOTAL      <200 mg/dL 154  191   Triglycerides      <150 mg/dL 47  73   HDL Cholestero Oli Coley REFERRAL    3. Bilateral lower extremity edema  Mild to moderate, chronic. Order for compression knee highs.  - DME - EXTERNAL     4.  Encounter for long-term current use of medication  Medication use, risks, benefits, side effects

## 2018-12-31 ENCOUNTER — TELEPHONE (OUTPATIENT)
Dept: NEUROLOGY | Facility: CLINIC | Age: 73
End: 2018-12-31

## 2018-12-31 PROCEDURE — 99490 CHRNC CARE MGMT STAFF 1ST 20: CPT

## 2018-12-31 NOTE — TELEPHONE ENCOUNTER
Per Epic review, Rx was sent on 8/29/18 for #270/1 additional refill. Should still have 1 refill on file with pharmacy. Spoke with Mary at pharmacy and confirmed this. They will get it filled for pt, turn around 5-7 business days.  Can expedite but up ch

## 2019-01-03 ENCOUNTER — TELEPHONE (OUTPATIENT)
Dept: FAMILY MEDICINE CLINIC | Facility: CLINIC | Age: 74
End: 2019-01-03

## 2019-01-03 ENCOUNTER — PATIENT OUTREACH (OUTPATIENT)
Dept: CASE MANAGEMENT | Age: 74
End: 2019-01-03

## 2019-01-03 DIAGNOSIS — E11.22 DIABETES MELLITUS WITH STAGE 3 CHRONIC KIDNEY DISEASE (HCC): ICD-10-CM

## 2019-01-03 DIAGNOSIS — Z78.0 POSTMENOPAUSAL: Primary | ICD-10-CM

## 2019-01-03 DIAGNOSIS — I10 ESSENTIAL HYPERTENSION: ICD-10-CM

## 2019-01-03 DIAGNOSIS — R26.89 BALANCE PROBLEM: ICD-10-CM

## 2019-01-03 DIAGNOSIS — N18.30 DIABETES MELLITUS WITH STAGE 3 CHRONIC KIDNEY DISEASE (HCC): ICD-10-CM

## 2019-01-03 NOTE — TELEPHONE ENCOUNTER
Dr Rhea Koyanagi advise regarding  1. Lab results  2. Order for Dexa scan  3.  Change from tradjenta back to metformin

## 2019-01-03 NOTE — TELEPHONE ENCOUNTER
Chronic Care Nurse Manager Jax Beaulieu called and said there is a referral in the system for [address] [facility] and it is still pending from 12/27/18 and she wants to know why.

## 2019-01-03 NOTE — PROGRESS NOTES
1/3/2019  Spoke to Magda padilla for CCM. Updates to patient care team/ comments: None  Patient reported changes in medications: None  Med Adherence  Comment: Taking as directed     Health Maintenance: Due for mammogram, DEXA scan and colonoscopy.  Pt refuses For Follow Up: review progress and or barriers towards patients goals. Care managers interventions: As listed above and praised pt on not eating sweets and making healthier food choices.   Time Spent This Encounter Total: 20 min medical record review, t

## 2019-01-03 NOTE — TELEPHONE ENCOUNTER
While speaking to pt for monthly CCM outreach pt stated she still has not received a call with her lab results. Pt would like an order for DEXA scan. Pt also stated she was told by the pharmacy metformin is on refill but she is not taking that now.  Pt p

## 2019-01-03 NOTE — TELEPHONE ENCOUNTER
Dexa has been ordered for patient    Patient informed of lab results and Dr Billy Garcia notes  Patient informed that Dexa scan has been ordered and gave Central scheduling # to call to schedule appt  Patient advised that she should continue to take tradjenta

## 2019-01-03 NOTE — TELEPHONE ENCOUNTER
1.  Diabetes is stable, A1c is 5.9. Cholesterol is stable. Chronic kidney disease is a little worse.       2.  Okay for order for DEXA, please use diagnosis postmenopausal.    3. The reason patient was taken off the metformin was secondary to the chronic

## 2019-01-04 ENCOUNTER — TELEPHONE (OUTPATIENT)
Dept: FAMILY MEDICINE CLINIC | Facility: CLINIC | Age: 74
End: 2019-01-04

## 2019-01-04 NOTE — TELEPHONE ENCOUNTER
Spoke to Emeterio harper and discussed that looks like pt only has Medicaid?   Not sure what happened to her primary insurance and will have to look into this upon return to the office next week

## 2019-01-04 NOTE — TELEPHONE ENCOUNTER
[facility] Clinical Staff; P [facility] Referral Pool             REFERRAL CANCELLED     LCD/NCD NOT COVERED.       Doc,   looks like her compression stockings not covered with her insurance

## 2019-01-06 NOTE — TELEPHONE ENCOUNTER
Talia Ruvalcaba    Can you look into this? I think she might of been one of the [employer] [address] [place] pt's that insurance dropped out of the system per that email? IF so, can you please re-enter her insurance.   thanks

## 2019-01-06 NOTE — TELEPHONE ENCOUNTER
Please call patient and inform her compression knee high/stockings are not covered by her insurance. Patient can try calling her insurance. Otherwise, patient would need to pay for out-of-pocket.

## 2019-01-09 NOTE — TELEPHONE ENCOUNTER
Spoke to patient with instructions and she verbalized understanding. Copy of the order faxed to [facility] as pt states she will probably be going over there on Saturday.

## 2019-01-14 ENCOUNTER — TELEPHONE (OUTPATIENT)
Dept: CASE MANAGEMENT | Age: 74
End: 2019-01-14

## 2019-01-14 ENCOUNTER — TELEPHONE (OUTPATIENT)
Dept: FAMILY MEDICINE CLINIC | Facility: CLINIC | Age: 74
End: 2019-01-14

## 2019-01-14 NOTE — TELEPHONE ENCOUNTER
Called Dr. Blossom Barragan office to follow up on the status of pt referral for PT. I was advised waiting on insurance to approve it.

## 2019-01-14 NOTE — TELEPHONE ENCOUNTER
----- Message from Edilberto Deleon DO sent at 1/10/2019  7:36 PM CST -----  Please inform patient diabetes is well controlled. Chronic kidney disease is stable. Maintain good hydration, avoid NSAIDs such as ibuprofen and Aleve.   Follow-up office visit i

## 2019-01-22 ENCOUNTER — TELEPHONE (OUTPATIENT)
Dept: NEUROLOGY | Facility: CLINIC | Age: 74
End: 2019-01-22

## 2019-01-22 ENCOUNTER — TELEPHONE (OUTPATIENT)
Dept: FAMILY MEDICINE CLINIC | Facility: CLINIC | Age: 74
End: 2019-01-22

## 2019-01-22 NOTE — TELEPHONE ENCOUNTER
Spoke with patient who states she has been taking Gabapentin and it does help but continues to have symptoms. C/o still having neuropathy issues today, her balance is off, she is shaking, she feels it all over her body.     Patient did not have EMG done yet

## 2019-01-22 NOTE — TELEPHONE ENCOUNTER
Sheila Farias is calling to let Dr Nimo Billingsley know that she is having neuropathy issues today, her balance is off, she is shaking, she feels it all over her body she did say she has a appt with Dr Bill Garcia tomorrow, Per Em Vidal I directed her to call Dr Doylene Paget o

## 2019-01-22 NOTE — TELEPHONE ENCOUNTER
MRI is not recommended, only EMG at this time. Also, I recommend to try increasing gabapentin to 200mg TID. If develops dizziness or drowsiness, she should go back down to 100mg TID.

## 2019-01-23 RX ORDER — ROSUVASTATIN CALCIUM 5 MG/1
TABLET, COATED ORAL
Qty: 90 TABLET | Refills: 0 | Status: SHIPPED | OUTPATIENT
Start: 2019-01-23 | End: 2019-05-30 | Stop reason: DRUGHIGH

## 2019-01-31 ENCOUNTER — TELEPHONE (OUTPATIENT)
Dept: FAMILY MEDICINE CLINIC | Facility: CLINIC | Age: 74
End: 2019-01-31

## 2019-01-31 PROCEDURE — 99490 CHRNC CARE MGMT STAFF 1ST 20: CPT

## 2019-01-31 NOTE — TELEPHONE ENCOUNTER
Miracle Sosa is calling to let Dr Eddi Ruiz know that she requested home health for her laundry and bathing, they will be sending over orders for Miracle Sosa, she just wanted Dr Eddi Ruiz to know.  Questions or concerns please call Maryjo at [phone]

## 2019-01-31 NOTE — TELEPHONE ENCOUNTER
Noted.  I do not think home health covers assisting patients with laundry. They may cover assisting with bathing.

## 2019-02-01 ENCOUNTER — PATIENT OUTREACH (OUTPATIENT)
Dept: CASE MANAGEMENT | Age: 74
End: 2019-02-01

## 2019-02-01 DIAGNOSIS — N18.30 DIABETES MELLITUS WITH STAGE 3 CHRONIC KIDNEY DISEASE (HCC): ICD-10-CM

## 2019-02-01 DIAGNOSIS — N18.30 CKD (CHRONIC KIDNEY DISEASE) STAGE 3, GFR 30-59 ML/MIN (HCC): ICD-10-CM

## 2019-02-01 DIAGNOSIS — E11.22 DIABETES MELLITUS WITH STAGE 3 CHRONIC KIDNEY DISEASE (HCC): ICD-10-CM

## 2019-02-01 DIAGNOSIS — R26.89 BALANCE PROBLEM: ICD-10-CM

## 2019-02-01 NOTE — PROGRESS NOTES
2/1/2019  Spoke to Magda padilla for CCM.       Updates to patient care team/ comments: None  Patient reported changes in medications: None  Med Adherence  Comment: Taking as directed     Health Maintenance: Due for mammogram - pt declines, Dexa scan - pt has orde urinates. Discussed with pt why she can't take metformin. Went over lab values and discussed results from December and June.   Time Spent This Encounter Total: 29 min medical record review, telephone communication, care plan updates where needed, education,

## 2019-02-08 ENCOUNTER — OFFICE VISIT (OUTPATIENT)
Dept: FAMILY MEDICINE CLINIC | Facility: CLINIC | Age: 74
End: 2019-02-08
Payer: MEDICARE

## 2019-02-08 ENCOUNTER — TELEPHONE (OUTPATIENT)
Dept: SURGERY | Facility: CLINIC | Age: 74
End: 2019-02-08

## 2019-02-08 VITALS
TEMPERATURE: 99 F | BODY MASS INDEX: 36.57 KG/M2 | DIASTOLIC BLOOD PRESSURE: 74 MMHG | WEIGHT: 209 LBS | SYSTOLIC BLOOD PRESSURE: 124 MMHG | HEIGHT: 63.5 IN | RESPIRATION RATE: 20 BRPM | HEART RATE: 68 BPM | OXYGEN SATURATION: 95 %

## 2019-02-08 DIAGNOSIS — L02.414 ABSCESS OF LEFT SHOULDER: Primary | ICD-10-CM

## 2019-02-08 DIAGNOSIS — L72.3 SEBACEOUS CYST OF LEFT AXILLA: ICD-10-CM

## 2019-02-08 DIAGNOSIS — E11.22 DIABETES MELLITUS WITH STAGE 3 CHRONIC KIDNEY DISEASE (HCC): ICD-10-CM

## 2019-02-08 DIAGNOSIS — N18.30 DIABETES MELLITUS WITH STAGE 3 CHRONIC KIDNEY DISEASE (HCC): ICD-10-CM

## 2019-02-08 PROCEDURE — 99213 OFFICE O/P EST LOW 20 MIN: CPT | Performed by: NURSE PRACTITIONER

## 2019-02-08 NOTE — PROGRESS NOTES
CHIEF COMPLAINT:   Patient presents with:  Abscess: at left shoulder for 3-4 days  Redness: drainage  Swelling      HPI:     Natanael Urban is a 68year old female who presents with dtr for concerns of infection on left shoulder.  Patient first noticed Chronic systolic congestive heart failure ([facility] 6/21/2017   • CKD (chronic kidney disease) stage 3, GFR 30-59 ml/min ([facility]) 4/4/2017   • Congestive heart disease ([facility]    • Diabetes mellitus with stage 3 chronic kidney disease ([facility] 5/7/2018   • Dizziness 3 in diameter  + erythema, mild tenderness, increased warmth, fluctuance, able to express purulent drainage. Small firm sebaceous cyst under left axilla; no erythema, tenderness, drainage.   HEAD: atraumatic, normocephalic  EYES: conjunctiva clear  NOSE: Nor

## 2019-02-08 NOTE — TELEPHONE ENCOUNTER
left voice mail for patient asking to come at 1pm on Wednesday 2/13/2019  asked patient to call back to confirm time is ok.

## 2019-02-13 ENCOUNTER — OFFICE VISIT (OUTPATIENT)
Dept: NEUROLOGY | Facility: CLINIC | Age: 74
End: 2019-02-13
Payer: MEDICARE

## 2019-02-13 VITALS
SYSTOLIC BLOOD PRESSURE: 90 MMHG | DIASTOLIC BLOOD PRESSURE: 60 MMHG | BODY MASS INDEX: 37 KG/M2 | WEIGHT: 210 LBS | HEART RATE: 60 BPM

## 2019-02-13 DIAGNOSIS — R20.2 NUMBNESS AND TINGLING IN LEFT HAND: ICD-10-CM

## 2019-02-13 DIAGNOSIS — R20.0 NUMBNESS IN FEET: ICD-10-CM

## 2019-02-13 DIAGNOSIS — R20.0 NUMBNESS AND TINGLING IN LEFT HAND: ICD-10-CM

## 2019-02-13 DIAGNOSIS — R26.81 UNSTEADINESS ON FEET: ICD-10-CM

## 2019-02-13 DIAGNOSIS — G62.9 POLYNEUROPATHY: Primary | ICD-10-CM

## 2019-02-13 PROCEDURE — 99213 OFFICE O/P EST LOW 20 MIN: CPT | Performed by: OTHER

## 2019-02-13 RX ORDER — SULFAMETHOXAZOLE AND TRIMETHOPRIM 800; 160 MG/1; MG/1
TABLET ORAL
Refills: 0 | COMMUNITY
Start: 2019-02-08 | End: 2019-07-17 | Stop reason: ALTCHOICE

## 2019-02-13 RX ORDER — GABAPENTIN 100 MG/1
200 CAPSULE ORAL 3 TIMES DAILY
Qty: 540 CAPSULE | Refills: 1 | Status: SHIPPED | OUTPATIENT
Start: 2019-02-13 | End: 2019-09-03

## 2019-02-13 NOTE — PATIENT INSTRUCTIONS
After your visit at the [facility] office  today,  please direct any follow up questions or medication needs to the staff in our  [place] office so that your concerns may be promptly addressed.   We are available through [employer] or at the numbers below: be picked up in office. • Please allow the office 2-3 business days to fill the prescription. • Patient must present photo ID at time of .   PLEASE NOTE: PRESCRIPTIONS MUST BE PICKED UP PRIOR TO 3:00PM MONDAY-THURSDAY AND PRIOR TO 1:00PM ON FRIDAY completed by the patient prior to submitting forms to office staff. • Form completion may require an additional fee. • A signed Release of Information (NICKOLAS) must be on file before forms may be submitted.   When dropping off forms, please ask the recepti

## 2019-02-13 NOTE — PROGRESS NOTES
[facility] [address]   Neurology- INITIAL CLINIC VISIT  2018, 9:50 AM     Lea Due Patient Status:  No patient class for patient encounter    1945 MRN [id number]   [facility], [facility] hypertension 3/17/2017   • Exudative age-related macular degeneration of left eye with active choroidal neovascularization ([facility] 9/5/2018   • Gait instability 5/7/2018   • Hypokalemia 3/17/2017   • Iron deficiency anemia 3/17/2017   • Lumbar facet arthropa tablet (25 mg total) by mouth 2 (two) times daily with meals. , Disp: 180 tablet, Rfl: 3  •  aspirin 81 MG Oral Tab, Take 81 mg by mouth daily. , Disp: , Rfl:   •  Blood Pressure Monitor Does not apply Device, Use as directed, Disp: 1 Device, Rfl: 0  •  Sulf sec on R, proprioception were intact at the toes. Romberg sign was absent. Complex motor skills revealed normal coordination. Finger-nose-finger intact. Gait was cautious and antalgic, uses cane.      ASSESSMENT/ACTIVE PROBLEM LIST:   1. Polyneuropathy

## 2019-02-28 PROCEDURE — 99490 CHRNC CARE MGMT STAFF 1ST 20: CPT

## 2019-03-01 ENCOUNTER — PATIENT OUTREACH (OUTPATIENT)
Dept: CASE MANAGEMENT | Age: 74
End: 2019-03-01

## 2019-03-06 ENCOUNTER — PROCEDURE VISIT (OUTPATIENT)
Dept: NEUROLOGY | Facility: CLINIC | Age: 74
End: 2019-03-06
Payer: MEDICARE

## 2019-03-06 DIAGNOSIS — R20.0 NUMBNESS IN FEET: ICD-10-CM

## 2019-03-06 DIAGNOSIS — R20.0 NUMBNESS AND TINGLING IN LEFT HAND: Primary | ICD-10-CM

## 2019-03-06 DIAGNOSIS — R20.2 NUMBNESS AND TINGLING IN LEFT HAND: Primary | ICD-10-CM

## 2019-03-06 PROCEDURE — 95886 MUSC TEST DONE W/N TEST COMP: CPT | Performed by: OTHER

## 2019-03-06 PROCEDURE — 95885 MUSC TST DONE W/NERV TST LIM: CPT | Performed by: OTHER

## 2019-03-06 PROCEDURE — 95910 NRV CNDJ TEST 7-8 STUDIES: CPT | Performed by: OTHER

## 2019-03-06 NOTE — PROCEDURES
[facility]  Nerve Conduction & EMG Report    [address], [address]  Phone: [phone]            Full Name: Arleth Vergara Gender: Female  Patient ID: [id number] Date of Birth: [birth year] L. Tibialis anterior Deep peroneal (Fibular) L4-L5 N None None None None N N N N   L. Gastrocnemius (Medial head) Tibial S1-S2 N None None None None N N N N   L. Peroneus longus Perineal L5-S1 N None None None None N N N N   L.  Vastus medialis Femoral L2-L

## 2019-03-06 NOTE — PATIENT INSTRUCTIONS
After your visit at the [facility] office  today,  please direct any follow up questions or medication needs to the staff in our  [place] office so that your concerns may be promptly addressed.   We are available through "[employer] or at the numbers below: be picked up in office. • Please allow the office 2-3 business days to fill the prescription. • Patient must present photo ID at time of .   PLEASE NOTE: PRESCRIPTIONS MUST BE PICKED UP PRIOR TO 3:00PM MONDAY-THURSDAY AND PRIOR TO 1:00PM ON FRIDAY completed by the patient prior to submitting forms to office staff. • Form completion may require an additional fee. • A signed Release of Information (NICKOLAS) must be on file before forms may be submitted.   When dropping off forms, please ask the recepti

## 2019-03-14 ENCOUNTER — PATIENT OUTREACH (OUTPATIENT)
Dept: CASE MANAGEMENT | Age: 74
End: 2019-03-14

## 2019-03-14 NOTE — PROGRESS NOTES
Called patient for monthly CCM outreach, no answer phone rang multiple times then to fast busy twice      Chart review - 4 min  Time with patient - 0 min  Total time - 7 min

## 2019-03-25 ENCOUNTER — PATIENT OUTREACH (OUTPATIENT)
Dept: CASE MANAGEMENT | Age: 74
End: 2019-03-25

## 2019-03-28 ENCOUNTER — TELEPHONE (OUTPATIENT)
Dept: FAMILY MEDICINE CLINIC | Facility: CLINIC | Age: 74
End: 2019-03-28

## 2019-03-28 DIAGNOSIS — E11.22 DIABETES MELLITUS WITH STAGE 3 CHRONIC KIDNEY DISEASE (HCC): ICD-10-CM

## 2019-03-28 DIAGNOSIS — D50.9 IRON DEFICIENCY ANEMIA, UNSPECIFIED IRON DEFICIENCY ANEMIA TYPE: ICD-10-CM

## 2019-03-28 DIAGNOSIS — N18.30 CKD (CHRONIC KIDNEY DISEASE) STAGE 3, GFR 30-59 ML/MIN (HCC): ICD-10-CM

## 2019-03-28 DIAGNOSIS — I10 ESSENTIAL HYPERTENSION: Primary | ICD-10-CM

## 2019-03-28 DIAGNOSIS — N18.30 DIABETES MELLITUS WITH STAGE 3 CHRONIC KIDNEY DISEASE (HCC): ICD-10-CM

## 2019-03-28 NOTE — TELEPHONE ENCOUNTER
Received form from [de-identified] Rx for medical supplies for incontinence. Per Dr. Humza Lira pt does not have a dx of incontinence so form denied. Patient also needs labs done and an appt for a supervisit.   Called pt but no answer and unable to leave a VM

## 2019-04-22 ENCOUNTER — PATIENT OUTREACH (OUTPATIENT)
Dept: CASE MANAGEMENT | Age: 74
End: 2019-04-22

## 2019-04-22 NOTE — PROGRESS NOTES
Called patient for monthly CCM outreach,no answer no machine - went to a fast busy.       Chart review - 4 min  Time with patient - 0 min  Total time - 4 min

## 2019-04-30 ENCOUNTER — PATIENT OUTREACH (OUTPATIENT)
Dept: CASE MANAGEMENT | Age: 74
End: 2019-04-30

## 2019-04-30 NOTE — PROGRESS NOTES
Called patient for monthly CCM outreach, no answer phone goes to fast busy.       Chart review - 1 min  Time with patient - 0 min  Total time - 5 min

## 2019-05-03 ENCOUNTER — TELEPHONE (OUTPATIENT)
Dept: NEUROLOGY | Facility: CLINIC | Age: 74
End: 2019-05-03

## 2019-05-03 NOTE — TELEPHONE ENCOUNTER
3/6/19 EMG denied due to no authorization. Spoke to Ana with [employer]. Effective 3/1/19, pt has [address]. However, the RTE processed on 3/1/19 still showed pt with [employer] as primary and [employer] [employer] as secondary.   He transferred my call to

## 2019-05-15 ENCOUNTER — PATIENT OUTREACH (OUTPATIENT)
Dept: CASE MANAGEMENT | Age: 74
End: 2019-05-15

## 2019-05-15 NOTE — PROGRESS NOTES
Called patient for monthly CCM outreach, no answer no machine.       Chart review - 3 min  Time with patient - 0 min  Total time - 3 min

## 2019-05-28 ENCOUNTER — PATIENT OUTREACH (OUTPATIENT)
Dept: CASE MANAGEMENT | Age: 74
End: 2019-05-28

## 2019-05-28 NOTE — PROGRESS NOTES
Called patient for monthly CCM outreach, no answer goes to fast busy. No contact letter mailed.     Chart review - 2 min  Time with patient - 0 min  Total time - 5 min

## 2019-05-30 ENCOUNTER — OFFICE VISIT (OUTPATIENT)
Dept: FAMILY MEDICINE CLINIC | Facility: CLINIC | Age: 74
End: 2019-05-30
Payer: MEDICARE

## 2019-05-30 ENCOUNTER — TELEPHONE (OUTPATIENT)
Dept: FAMILY MEDICINE CLINIC | Facility: CLINIC | Age: 74
End: 2019-05-30

## 2019-05-30 ENCOUNTER — APPOINTMENT (OUTPATIENT)
Dept: LAB | Age: 74
End: 2019-05-30
Attending: FAMILY MEDICINE
Payer: MEDICARE

## 2019-05-30 VITALS
SYSTOLIC BLOOD PRESSURE: 106 MMHG | WEIGHT: 220 LBS | HEART RATE: 65 BPM | BODY MASS INDEX: 39.47 KG/M2 | DIASTOLIC BLOOD PRESSURE: 68 MMHG | HEIGHT: 62.5 IN

## 2019-05-30 DIAGNOSIS — I42.0 CARDIOMYOPATHY, DILATED (HCC): ICD-10-CM

## 2019-05-30 DIAGNOSIS — H35.3221 EXUDATIVE AGE-RELATED MACULAR DEGENERATION OF LEFT EYE WITH ACTIVE CHOROIDAL NEOVASCULARIZATION (HCC): ICD-10-CM

## 2019-05-30 DIAGNOSIS — R19.09 MASS OF RIGHT INGUINAL REGION: ICD-10-CM

## 2019-05-30 DIAGNOSIS — R60.0 BILATERAL LOWER EXTREMITY EDEMA: ICD-10-CM

## 2019-05-30 DIAGNOSIS — N18.30 CKD (CHRONIC KIDNEY DISEASE) STAGE 3, GFR 30-59 ML/MIN (HCC): ICD-10-CM

## 2019-05-30 DIAGNOSIS — L03.316 CELLULITIS, UMBILICAL: ICD-10-CM

## 2019-05-30 DIAGNOSIS — Z95.810 ICD (IMPLANTABLE CARDIOVERTER-DEFIBRILLATOR), BIVENTRICULAR, IN SITU: ICD-10-CM

## 2019-05-30 DIAGNOSIS — I50.22 CHRONIC SYSTOLIC CONGESTIVE HEART FAILURE (HCC): ICD-10-CM

## 2019-05-30 DIAGNOSIS — N18.30 DIABETES MELLITUS WITH STAGE 3 CHRONIC KIDNEY DISEASE (HCC): ICD-10-CM

## 2019-05-30 DIAGNOSIS — D50.9 IRON DEFICIENCY ANEMIA, UNSPECIFIED IRON DEFICIENCY ANEMIA TYPE: ICD-10-CM

## 2019-05-30 DIAGNOSIS — Z95.0 PACEMAKER: ICD-10-CM

## 2019-05-30 DIAGNOSIS — Z12.11 ENCOUNTER FOR SCREENING FECAL OCCULT BLOOD TESTING: ICD-10-CM

## 2019-05-30 DIAGNOSIS — R26.81 GAIT INSTABILITY: ICD-10-CM

## 2019-05-30 DIAGNOSIS — M47.816 ARTHRITIS OF FACET JOINT OF LUMBAR SPINE: ICD-10-CM

## 2019-05-30 DIAGNOSIS — E78.00 HYPERCHOLESTEROLEMIA: ICD-10-CM

## 2019-05-30 DIAGNOSIS — I10 ESSENTIAL HYPERTENSION: ICD-10-CM

## 2019-05-30 DIAGNOSIS — R20.2 NUMBNESS AND TINGLING OF BOTH LOWER EXTREMITIES: ICD-10-CM

## 2019-05-30 DIAGNOSIS — R29.898 WEAKNESS OF BOTH LOWER EXTREMITIES: ICD-10-CM

## 2019-05-30 DIAGNOSIS — R20.0 NUMBNESS OF FEET: ICD-10-CM

## 2019-05-30 DIAGNOSIS — Z53.20 MAMMOGRAM DECLINED: ICD-10-CM

## 2019-05-30 DIAGNOSIS — M17.0 PRIMARY OSTEOARTHRITIS OF BOTH KNEES: ICD-10-CM

## 2019-05-30 DIAGNOSIS — J44.9 CHRONIC OBSTRUCTIVE PULMONARY DISEASE, UNSPECIFIED COPD TYPE (HCC): ICD-10-CM

## 2019-05-30 DIAGNOSIS — B35.1 ONYCHOMYCOSIS: ICD-10-CM

## 2019-05-30 DIAGNOSIS — Z00.00 MEDICARE ANNUAL WELLNESS VISIT, SUBSEQUENT: Primary | ICD-10-CM

## 2019-05-30 DIAGNOSIS — G62.9 POLYNEUROPATHY: ICD-10-CM

## 2019-05-30 DIAGNOSIS — E66.01 CLASS 2 SEVERE OBESITY DUE TO EXCESS CALORIES WITH SERIOUS COMORBIDITY AND BODY MASS INDEX (BMI) OF 39.0 TO 39.9 IN ADULT (HCC): ICD-10-CM

## 2019-05-30 DIAGNOSIS — E87.6 HYPOKALEMIA: ICD-10-CM

## 2019-05-30 DIAGNOSIS — M47.816 LUMBAR FACET ARTHROPATHY: ICD-10-CM

## 2019-05-30 DIAGNOSIS — E11.22 DIABETES MELLITUS WITH STAGE 3 CHRONIC KIDNEY DISEASE (HCC): ICD-10-CM

## 2019-05-30 DIAGNOSIS — Z79.899 ENCOUNTER FOR LONG-TERM CURRENT USE OF MEDICATION: ICD-10-CM

## 2019-05-30 DIAGNOSIS — R20.0 NUMBNESS AND TINGLING OF BOTH LOWER EXTREMITIES: ICD-10-CM

## 2019-05-30 PROBLEM — M71.21 POPLITEAL CYST, RIGHT: Status: RESOLVED | Noted: 2018-06-02 | Resolved: 2019-05-30

## 2019-05-30 PROBLEM — R42 DIZZINESS: Status: RESOLVED | Noted: 2017-03-17 | Resolved: 2019-05-30

## 2019-05-30 PROBLEM — L30.9 DERMATITIS: Status: RESOLVED | Noted: 2018-07-04 | Resolved: 2019-05-30

## 2019-05-30 PROBLEM — M71.22 POPLITEAL CYST, LEFT: Status: RESOLVED | Noted: 2018-06-02 | Resolved: 2019-05-30

## 2019-05-30 PROBLEM — R27.0 ATAXIA: Status: RESOLVED | Noted: 2018-12-27 | Resolved: 2019-05-30

## 2019-05-30 PROBLEM — R26.89 BALANCE PROBLEM: Status: RESOLVED | Noted: 2018-05-07 | Resolved: 2019-05-30

## 2019-05-30 PROCEDURE — 99397 PER PM REEVAL EST PAT 65+ YR: CPT | Performed by: FAMILY MEDICINE

## 2019-05-30 PROCEDURE — 96160 PT-FOCUSED HLTH RISK ASSMT: CPT | Performed by: FAMILY MEDICINE

## 2019-05-30 PROCEDURE — G0439 PPPS, SUBSEQ VISIT: HCPCS | Performed by: FAMILY MEDICINE

## 2019-05-30 RX ORDER — POTASSIUM CHLORIDE 750 MG/1
10 TABLET, FILM COATED, EXTENDED RELEASE ORAL DAILY
COMMUNITY
Start: 2016-03-17

## 2019-05-30 RX ORDER — ROSUVASTATIN CALCIUM 10 MG/1
10 TABLET, COATED ORAL NIGHTLY
Qty: 90 TABLET | Refills: 1 | Status: SHIPPED | OUTPATIENT
Start: 2019-05-30

## 2019-05-30 NOTE — PATIENT INSTRUCTIONS
Maryjo Griggs's SCREENING SCHEDULE   Tests on this list are recommended by your physician but may not be covered, or covered at this frequency, by your insurer. Please check with your insurance carrier before scheduling to verify coverage.    PREVEN every 10 years- more often if abnormal Colonoscopy due on 07/29/1945 Update [facility] if applicable    Flex Sigmoidoscopy Screen  Covered every 5 years No results found for this or any previous visit. No flowsheet data found.      Fecal Occult Bloo previous visit.  Please get every year    Pneumococcal 13 (Prevnar)  Covered Once after 65 Orders placed or performed in visit on 05/07/18   • PNEUMOCOCCAL VACC, 13 JEFF IM    Please get once after your 65th birthday    Pneumococcal 23 (Pneumovax)  Covered O

## 2019-05-30 NOTE — PROGRESS NOTES
HPI:   Kylie Ramsey is a 68year old female who presents for a MA (Medicare Advantage) Supervisit (Once per calendar year).            Fall/Risk Assessment   She has been screened for Falls and is low risk: Fall/Risk Scorin    Cognitive Assessm or hopeless (over the last two weeks)?: Not at all  PHQ-2 SCORE: 0     Advanced Directive:   She does NOT have a Living Will on file in [facility] [address]    Advance care planning including the explanation and discussion of advance directives standard forms performed Fa facet joint of lumbar spine (HCC)     Class 2 severe obesity due to excess calories with serious comorbidity and body mass index (BMI) of 39.0 to 39.9 in adult [facility])     Cellulitis, umbilical     Onychomycosis     Encounter for long-term current use of med daily with meals. aspirin 81 MG Oral Tab Take 81 mg by mouth daily.    Blood Pressure Monitor Does not apply Device Use as directed      MEDICAL INFORMATION:   She  has a past medical history of Atherosclerosis of coronary artery, Balance problem (5/7/201 in vision  HEENT: denies nasal congestion, sinus pain or ST  LUNGS: denies shortness of breath with exertion but does not tend to exert herself  CARDIOVASCULAR: denies chest pain on exertion but does not tend to exert herself  GI: denies abdominal pain, de Immunization History   Administered Date(s) Administered   • Depo-Medrol 40mg Inj 07/17/2018, 07/17/2018   • FLUAD High Dose 65 yr and older (06869) 11/08/2017, 12/27/2018   • Influenza 03/12/2015   • Pneumococcal (Prevnar 13) 05/07/2018   • Pneumovax subsequent  (primary encounter diagnosis)  Cardiomyopathy, dilated (hcc)  Chronic systolic congestive heart failure (hcc)  Ckd (chronic kidney disease) stage 3, gfr 30-59 ml/min (hcc)  Essential hypertension  Icd (implantable cardioverter-defibrillator), b potassium, losartan, and aspirin.  - CARDIO - INTERNAL    9. Bilateral lower extremity edema  Recommend graduated compression knee highs or stockings. Consider referral to lymphedema clinic in the near future.     10. Diabetes mellitus with stage 3 chronic after patient's office visit today, will have staff reach out to patient to schedule ultrasound of right inguinal area. - US GROIN RIGHT LIMITED SH(CPT=76882); Future    (Neurological)  19. Polyneuropathy  20. Gait instability  21. Numbness of feet  22. MG Oral Tab 90 tablet 1     Sig: Take 1 tablet (10 mg total) by mouth nightly.        Imaging & Consults:  PODIATRY - INTERNAL  DME - EXTERNAL   CARDIO - INTERNAL  US GROIN RIGHT LIMITED SH(CPT=76882)        PLAN SUMMARY:       And as above    Diet assessme Comment:     Only one card inoculated with stool    No flowsheet data found.     Glaucoma Screening      Ophthalmology Visit Annually: Diabetics, FHx Glaucoma, AA>50, > 65 Data entered on: 2/8/2018   Last Dilated Eye Exam 1/27/2018       Bone Densit Annual Monitoring of Persistent     Medications (ACE/ARB, digoxin diuretics, anticonvulsants.)    Potassium  Annually Potassium (mmol/L)   Date Value   05/30/2019 3.3 (L)    No flowsheet data found.     Creatinine  Annually Creatinine (mg/dL)   Date Value

## 2019-05-31 NOTE — TELEPHONE ENCOUNTER
Please call patient: Chart was further reviewed after her super visit on 5/30/2019 and it is noted that in May 2018 patient had an ultrasound and a mass of the right groin area was reported.   Patient was referred to Dr. Clara Combs for further evaluation and tr

## 2019-06-02 ENCOUNTER — HOSPITAL ENCOUNTER (OUTPATIENT)
Dept: ULTRASOUND IMAGING | Age: 74
Discharge: HOME | End: 2019-06-02
Attending: FAMILY MEDICINE
Payer: MEDICARE

## 2019-06-02 DIAGNOSIS — R19.09 MASS OF RIGHT INGUINAL REGION: ICD-10-CM

## 2019-06-02 PROCEDURE — 76882 US LMTD JT/FCL EVL NVASC XTR: CPT | Performed by: FAMILY MEDICINE

## 2019-06-10 ENCOUNTER — TELEPHONE (OUTPATIENT)
Dept: FAMILY MEDICINE CLINIC | Facility: CLINIC | Age: 74
End: 2019-06-10

## 2019-06-10 NOTE — TELEPHONE ENCOUNTER
Fartun Garcia is calling to get the status on her orders that were suppose to be sent to her [facility] ins for her shower chair, wheel chair, and Diabetic shoes, it has been over a week and no one has the orders for these things, she would like someone to give her a

## 2019-06-14 ENCOUNTER — TELEPHONE (OUTPATIENT)
Dept: FAMILY MEDICINE CLINIC | Facility: CLINIC | Age: 74
End: 2019-06-14

## 2019-06-21 ENCOUNTER — PATIENT OUTREACH (OUTPATIENT)
Dept: CASE MANAGEMENT | Age: 74
End: 2019-06-21

## 2019-06-21 NOTE — PROGRESS NOTES
Called pt for monthly CCM outreach, no answer goes to fast busy. No contact letter mailed last month with no response.   Removing pt from CCM program.

## 2019-07-03 ENCOUNTER — TELEPHONE (OUTPATIENT)
Dept: FAMILY MEDICINE CLINIC | Facility: CLINIC | Age: 74
End: 2019-07-03

## 2019-07-03 RX ORDER — METHYLDOPA 250 MG
1 TABLET ORAL 2 TIMES DAILY WITH MEALS
Qty: 60 TABLET | Refills: 0 | Status: SHIPPED | OUTPATIENT
Start: 2019-07-03

## 2019-07-03 RX ORDER — PNV NO.95/FERROUS FUM/FOLIC AC 28MG-0.8MG
1 TABLET ORAL 2 TIMES DAILY WITH MEALS
Qty: 60 TABLET | Refills: 0 | Status: SHIPPED | OUTPATIENT
Start: 2019-07-03 | End: 2019-09-08

## 2019-07-03 NOTE — TELEPHONE ENCOUNTER
The patient was read Dr. Alonso Montana result note comments verbatim. Per patient request the OTC medications were sent to her pharmacy. A follow-up appointment with Dr. Marlon Lofton was scheduled for 7/26/2019 at 2:00p.mDonald Gould

## 2019-07-03 NOTE — TELEPHONE ENCOUNTER
----- Message from Ricki Burris DO sent at 7/1/2019  9:57 PM CDT -----  Please call patient: Iron is low. Please have patient schedule another appointment to see me to discuss blood test results in person.   Please apologize that we had to cancel the a

## 2019-07-12 ENCOUNTER — TELEPHONE (OUTPATIENT)
Dept: FAMILY MEDICINE CLINIC | Facility: CLINIC | Age: 74
End: 2019-07-12

## 2019-07-12 NOTE — TELEPHONE ENCOUNTER
Fartun Garcia is calling to let Dr Samual Pallas know that she has been having SOB, Back and Stomach have been feeling like they are in a knot, her legs and thighs feel like there is water running down them, she thinks she needs to go on dialysis, Please call Maryjo anand

## 2019-07-12 NOTE — TELEPHONE ENCOUNTER
Please call patient again and advised her to go to the nearest emergency department, okay to call 911 if she needs to. Shortness of breath may be a symptom of worsening heart failure.   Also, please alert patient's cardiologist's office of patient's sympto

## 2019-07-12 NOTE — TELEPHONE ENCOUNTER
Phoned patient. Instructed her to go to the ER d/t symptoms. She stated no one was home to bring her. I told her to call 911. Phoned Dr. Maryanne Zepeda office and notified them of patients symptoms.

## 2019-07-12 NOTE — TELEPHONE ENCOUNTER
Phoned patient. She states she is more SOB than usual.  She takes a few steps and can't breathe. ABD and back pain are constant. It feels like water is running down her leg on the inside of her leg. LE extremity edema is the same.  I recommended ER d/t

## 2019-07-17 ENCOUNTER — TELEPHONE (OUTPATIENT)
Dept: NEUROLOGY | Facility: CLINIC | Age: 74
End: 2019-07-17

## 2019-07-17 ENCOUNTER — OFFICE VISIT (OUTPATIENT)
Dept: NEUROLOGY | Facility: CLINIC | Age: 74
End: 2019-07-17
Payer: MEDICARE

## 2019-07-17 VITALS — SYSTOLIC BLOOD PRESSURE: 122 MMHG | RESPIRATION RATE: 14 BRPM | HEART RATE: 64 BPM | DIASTOLIC BLOOD PRESSURE: 70 MMHG

## 2019-07-17 DIAGNOSIS — G56.22 ULNAR NEUROPATHY AT ELBOW OF LEFT UPPER EXTREMITY: Primary | ICD-10-CM

## 2019-07-17 DIAGNOSIS — R26.81 UNSTEADINESS ON FEET: ICD-10-CM

## 2019-07-17 DIAGNOSIS — G62.9 POLYNEUROPATHY: ICD-10-CM

## 2019-07-17 DIAGNOSIS — M48.061 SPINAL STENOSIS OF LUMBAR REGION WITHOUT NEUROGENIC CLAUDICATION: ICD-10-CM

## 2019-07-17 DIAGNOSIS — R20.2 PARESTHESIAS: ICD-10-CM

## 2019-07-17 PROCEDURE — 99213 OFFICE O/P EST LOW 20 MIN: CPT | Performed by: OTHER

## 2019-07-17 NOTE — TELEPHONE ENCOUNTER
Pt seen today for office visit. Per Dr. Salud Barragan, orders for wheelchair and shower bench placed. Spoke with pt, states she has been talking with her insurance regarding this. States we need to call her insurance to have the referrals (orders) sent.  Whe

## 2019-07-17 NOTE — TELEPHONE ENCOUNTER
Called the Clinic Intake Team. Spoke with Dustin Conway (, not with the clinic intake team). States that with pt plan, a referral needs to be initiated. At first it was that only the PCP could do this.  Vinayak Fitzgerald then states that it can be from

## 2019-07-17 NOTE — PROGRESS NOTES
[facility] [address]   Neurology- f/u    Ayala Shah Patient Status:  No patient class for patient encounter    1945 MRN [id number]   Location [address] [place] PCP Nitesh Valdez DO TSH      0.350 - 5.500 mIU/mL 0.787      Past Medical History:  Past Medical History:   Diagnosis Date   • Atherosclerosis of coronary artery     non-obstructive   • Balance problem 5/7/2018   • Bilateral lower extremity edema 5/7/2018   • Cardiomyopathy SWELLING    MEDICATIONS:    Current Outpatient Medications:   •  Ferrous Sulfate (IRON) 325 (65 Fe) MG Oral Tab, Take 1 tablet by mouth 2 (two) times daily with meals. , Disp: 60 tablet, Rfl: 0  •  Bioflavonoid Products (ROBYN-C) Oral Tab, Take 1 tablet by bilaterally  Skin: There are no rashes or other skin lesions. Musculoskeletal: There is no scoliosis, or joint deformities  Neurologic examination:  Mental status: Patient is alert, attentive, and oriented x 3.  Language is coherent and fluent without apha No prescriptions requested or ordered in this encounter          We discussed in depth regarding the diagnosis, prognosis, treatment. The patient was given ample opportunity to ask questions. All questions and concerns were addressed.      Renuka Buck

## 2019-07-17 NOTE — PATIENT INSTRUCTIONS
After your visit at the [facility] office  today,  please direct any follow up questions or medication needs to the staff in our  [place] office so that your concerns may be promptly addressed.   We are available through [employer] or at the numbers below: must be picked up in office. • Please allow the office 2-3 business days to fill the prescription. • Patient must present photo ID at time of . PLEASE NOTE: PRESCRIPTIONS MUST BE PICKED UP PRIOR TO 3:00PM MONDAY-FRIDAY    Scheduling Tests:     If submitting forms to office staff. • Form completion may require an additional fee. • A signed Release of Information (NICKOLAS) must be on file before forms may be submitted. When dropping off forms, please ask the  for this paper.    • Failure

## 2019-07-24 NOTE — TELEPHONE ENCOUNTER
Spoke with PCP staff. They are going to look into any CPT codes for DME.      Will most likely send this on to PCP to assist.

## 2019-07-26 NOTE — TELEPHONE ENCOUNTER
Called [employer] [employer] and they accept pts [employer] plan. Instructed order be sent to them along with demographics.    This was faxed to them and they will contact the patient with further instructions    [facility] Fax#[phone]

## 2019-07-31 ENCOUNTER — TELEPHONE (OUTPATIENT)
Dept: FAMILY MEDICINE CLINIC | Facility: CLINIC | Age: 74
End: 2019-07-31

## 2019-07-31 NOTE — TELEPHONE ENCOUNTER
Dinah Del Rio with [employer] called received an order for wheelchair and wanted to inform us they are out of network with patients insurance. Dinah Del Rio can be reached at [phone] with any questions.

## 2019-07-31 NOTE — TELEPHONE ENCOUNTER
Order faxed to a different DME vendor    [address] [place] [address]# [phone]  Fax# [phone]     They advised, they do accept patient's insurance. Once order is received, they will contact patient.

## 2019-08-02 ENCOUNTER — HOSPITAL ENCOUNTER (OUTPATIENT)
Dept: MRI IMAGING | Facility: HOSPITAL | Age: 74
Discharge: HOME | End: 2019-08-02
Attending: Other
Payer: MEDICARE

## 2019-08-02 DIAGNOSIS — M48.061 SPINAL STENOSIS OF LUMBAR REGION WITHOUT NEUROGENIC CLAUDICATION: ICD-10-CM

## 2019-08-02 PROCEDURE — 72148 MRI LUMBAR SPINE W/O DYE: CPT | Performed by: OTHER

## 2019-08-06 ENCOUNTER — TELEPHONE (OUTPATIENT)
Dept: FAMILY MEDICINE CLINIC | Facility: CLINIC | Age: 74
End: 2019-08-06

## 2019-08-06 ENCOUNTER — TELEPHONE (OUTPATIENT)
Dept: NEUROLOGY | Facility: CLINIC | Age: 74
End: 2019-08-06

## 2019-08-06 NOTE — TELEPHONE ENCOUNTER
Spoke with patient and she is requesting appointment sooner than the 16th. States she is having low back pain that seems to be worsening. Patient reports having this pain \"for a long time\" but has become concerned because her AVS says she has CKD.      So

## 2019-08-06 NOTE — TELEPHONE ENCOUNTER
Patient called stating she is having a lot of abdominal/back pain and it is very difficult to urinate would like to discuss with a nurse.

## 2019-08-06 NOTE — TELEPHONE ENCOUNTER
Called pt, informed her of results below per Dr. Larry Sparrow note, also informed pt that order for pt has been put into chart, advised her to call central scheduling to schedule appointment.      Pt also stated that she is experiencing \"kidney pain that wra

## 2019-08-06 NOTE — TELEPHONE ENCOUNTER
There are degenerative disc changes affecting the L5 and S1 nerve roots. These are chronic, and may be related to her foot numbness, as well as lower extremity pains. Physical therapy is recommended for this lumbar radiculopathy component.

## 2019-08-12 ENCOUNTER — HOSPITAL ENCOUNTER (OUTPATIENT)
Dept: CT IMAGING | Age: 74
Discharge: HOME | End: 2019-08-12
Attending: FAMILY MEDICINE
Payer: MEDICARE

## 2019-08-12 ENCOUNTER — OFFICE VISIT (OUTPATIENT)
Dept: FAMILY MEDICINE CLINIC | Facility: CLINIC | Age: 74
End: 2019-08-12
Payer: MEDICARE

## 2019-08-12 VITALS
HEART RATE: 62 BPM | HEIGHT: 62.5 IN | DIASTOLIC BLOOD PRESSURE: 70 MMHG | TEMPERATURE: 99 F | SYSTOLIC BLOOD PRESSURE: 120 MMHG | WEIGHT: 220.13 LBS | BODY MASS INDEX: 39.5 KG/M2

## 2019-08-12 DIAGNOSIS — G62.9 POLYNEUROPATHY: ICD-10-CM

## 2019-08-12 DIAGNOSIS — Z23 NEED FOR VACCINATION: ICD-10-CM

## 2019-08-12 DIAGNOSIS — R20.2 NUMBNESS AND TINGLING OF BOTH LOWER EXTREMITIES: ICD-10-CM

## 2019-08-12 DIAGNOSIS — R19.8 ABDOMINAL INVOLUNTARY GUARDING: ICD-10-CM

## 2019-08-12 DIAGNOSIS — N18.30 CKD (CHRONIC KIDNEY DISEASE) STAGE 3, GFR 30-59 ML/MIN (HCC): ICD-10-CM

## 2019-08-12 DIAGNOSIS — Z79.899 POLYPHARMACY: ICD-10-CM

## 2019-08-12 DIAGNOSIS — R29.898 WEAKNESS OF BOTH LOWER EXTREMITIES: ICD-10-CM

## 2019-08-12 DIAGNOSIS — D50.9 IRON DEFICIENCY ANEMIA, UNSPECIFIED IRON DEFICIENCY ANEMIA TYPE: ICD-10-CM

## 2019-08-12 DIAGNOSIS — R59.9 ENLARGED LYMPH NODE: ICD-10-CM

## 2019-08-12 DIAGNOSIS — R20.0 NUMBNESS AND TINGLING OF BOTH LOWER EXTREMITIES: ICD-10-CM

## 2019-08-12 DIAGNOSIS — E11.22 DIABETES MELLITUS WITH STAGE 3 CHRONIC KIDNEY DISEASE (HCC): ICD-10-CM

## 2019-08-12 DIAGNOSIS — N18.30 DIABETES MELLITUS WITH STAGE 3 CHRONIC KIDNEY DISEASE (HCC): ICD-10-CM

## 2019-08-12 DIAGNOSIS — R10.33 PERIUMBILICAL ABDOMINAL PAIN: Primary | ICD-10-CM

## 2019-08-12 DIAGNOSIS — Z12.31 ENCOUNTER FOR SCREENING MAMMOGRAM FOR MALIGNANT NEOPLASM OF BREAST: ICD-10-CM

## 2019-08-12 DIAGNOSIS — L03.316 CELLULITIS OF UMBILICUS: ICD-10-CM

## 2019-08-12 DIAGNOSIS — R26.81 GAIT INSTABILITY: ICD-10-CM

## 2019-08-12 DIAGNOSIS — R10.33 PERIUMBILICAL ABDOMINAL PAIN: ICD-10-CM

## 2019-08-12 PROBLEM — Z45.02: Status: ACTIVE | Noted: 2019-08-12

## 2019-08-12 PROCEDURE — 99215 OFFICE O/P EST HI 40 MIN: CPT | Performed by: FAMILY MEDICINE

## 2019-08-12 PROCEDURE — 90714 TD VACC NO PRESV 7 YRS+ IM: CPT | Performed by: FAMILY MEDICINE

## 2019-08-12 PROCEDURE — 90471 IMMUNIZATION ADMIN: CPT | Performed by: FAMILY MEDICINE

## 2019-08-12 PROCEDURE — 74176 CT ABD & PELVIS W/O CONTRAST: CPT | Performed by: FAMILY MEDICINE

## 2019-08-12 PROCEDURE — 90472 IMMUNIZATION ADMIN EACH ADD: CPT | Performed by: FAMILY MEDICINE

## 2019-08-12 RX ORDER — CLINDAMYCIN HYDROCHLORIDE 300 MG/1
300 CAPSULE ORAL 4 TIMES DAILY
Qty: 28 CAPSULE | Refills: 0 | Status: SHIPPED | OUTPATIENT
Start: 2019-08-12 | End: 2019-08-19

## 2019-08-12 NOTE — PROGRESS NOTES
Left message informing patient that a homehealth nurse will be contacting her to set up an appointment to review all medications. Asked patient to call office with any questions.

## 2019-08-12 NOTE — PATIENT INSTRUCTIONS
-Take OTC iron supplement with vitamin C together twice a day with meals.  -The iron may cause constipation so be sure to stay well hydrated and eat plenty of veggies and fruit.  -Okay to take OTC MiraLax if you get constipation.

## 2019-08-12 NOTE — PROGRESS NOTES
HPI:   Isha Brooks is a 76year old female    Patient is accompanied by her daughter, her daughter does not offer any insight into patient's review systems or issues. Patient's daughter did not speak during patient's office visit.     Roomer's note tablet (5 mg total) by mouth every morning. (Patient taking differently: Take 5 mg by mouth 3 (three) times daily.  ) Disp: 90 tablet Rfl: 3   Rosuvastatin Calcium 10 MG Oral Tab Take 1 tablet (10 mg total) by mouth nightly.  Disp: 90 tablet Rfl: 1   DILIA both lower extremities 6/2/2018   • Popliteal cyst, left 6/2/2018   • Popliteal cyst, right 6/2/2018   • Severe obesity (BMI 35.0-39.9) 3/17/2017   • Type 2 diabetes mellitus without complication, without long-term current use of insulin ([facility] 3/17/2017 PROCEDURE:  US GROIN RIGHT LIMITED (RAE=41589)     COMPARISON:  None.      INDICATIONS:  R19.09 Other intra-abdominal and pelvic swelling, mass and lump     TECHNIQUE:  Sonography was performed of the clinically requested area of interest.     PATIENT STA %      % 0.2     Glucose      70 - 99 mg/dL 107 (H) 96 97   Sodium      136 - 145 mmol/L 141 142 142   Potassium      3.5 - 5.1 mmol/L 3.3 (L) 3.8 3.1 (L)   Chloride      98 - 112 mmol/L 103 106 104   Carbon Dioxide, Total      21.0 - 32.0 mmol/L 31.0 32. 0 COMPARISON:  None.      INDICATIONS:  R19.8 Other specified symptoms and signs involving the digestive system and abdomen R59.9 Enlarged lymph nodes, unspecified M49.82 Periumbilical pain     TECHNIQUE:  Unenhanced multislice CT scanning was performed fro inflammatory change or fluid collection involving the region of the umbilicus. Minimal skin thickening is noted. Incidentally noted is a right posterior lateral bladder diverticulum.         Dictated by: Fabiola Felix MD on 8/12/2019 at 19:10 to Dr. Lan Wolfe again for biopsy of enlarged inguinal lymph node. - SURGERY - INTERNAL    5. Diabetes mellitus with stage 3 chronic kidney disease ([address]  Controlled. Continue Tradjenta. Healthy diet encouraged.     6. CKD (chronic kidney disease) stage 3 BILAT (CPT=77067)    Return in about 3 months (around 11/12/2019) for Chronic Conditions and as needed.

## 2019-08-13 ENCOUNTER — TELEPHONE (OUTPATIENT)
Dept: FAMILY MEDICINE CLINIC | Facility: CLINIC | Age: 74
End: 2019-08-13

## 2019-08-13 DIAGNOSIS — G62.9 POLYNEUROPATHY: ICD-10-CM

## 2019-08-13 DIAGNOSIS — Z79.899 POLYPHARMACY: Primary | ICD-10-CM

## 2019-08-13 DIAGNOSIS — R20.0 NUMBNESS AND TINGLING OF BOTH LOWER EXTREMITIES: ICD-10-CM

## 2019-08-13 DIAGNOSIS — R29.898 WEAKNESS OF BOTH LOWER EXTREMITIES: ICD-10-CM

## 2019-08-13 DIAGNOSIS — R26.81 GAIT INSTABILITY: ICD-10-CM

## 2019-08-13 DIAGNOSIS — R20.2 NUMBNESS AND TINGLING OF BOTH LOWER EXTREMITIES: ICD-10-CM

## 2019-08-13 NOTE — TELEPHONE ENCOUNTER
New referral placed for [facility]. Face sheet, order, and clinical documentation sent to them. They stated they would get back to us if they take her insurance.

## 2019-08-13 NOTE — TELEPHONE ENCOUNTER
Joe Wolff from [facility] phoned and d/t insurance cannot accept patient.

## 2019-08-13 NOTE — TELEPHONE ENCOUNTER
Henry Cano had a CT done yesterday and she is calling for the results, she is still having a lot of pain in her stomach, please call Henry Cano with any questions or concerns at [phone]

## 2019-08-14 ENCOUNTER — TELEPHONE (OUTPATIENT)
Dept: FAMILY MEDICINE CLINIC | Facility: CLINIC | Age: 74
End: 2019-08-14

## 2019-08-14 NOTE — TELEPHONE ENCOUNTER
Phoned [facility] to check on status of referral.  They have patient on the schedule for today to start home health services.

## 2019-08-14 NOTE — TELEPHONE ENCOUNTER
[facility] phoned back. They stated patient refused home health. She told the nurse she would not have home health.

## 2019-08-15 NOTE — TELEPHONE ENCOUNTER
Please instruct patient to make an appointment to see me for follow-up in 4 to 6 weeks and to bring all of her medication bottles with her.

## 2019-08-29 ENCOUNTER — OFFICE VISIT (OUTPATIENT)
Dept: SURGERY | Facility: CLINIC | Age: 74
End: 2019-08-29
Payer: MEDICARE

## 2019-08-29 VITALS
TEMPERATURE: 98 F | BODY MASS INDEX: 37.5 KG/M2 | WEIGHT: 209 LBS | HEART RATE: 69 BPM | DIASTOLIC BLOOD PRESSURE: 70 MMHG | SYSTOLIC BLOOD PRESSURE: 104 MMHG | HEIGHT: 62.5 IN

## 2019-08-29 DIAGNOSIS — K59.00 CONSTIPATION, UNSPECIFIED CONSTIPATION TYPE: ICD-10-CM

## 2019-08-29 DIAGNOSIS — N18.30 CKD (CHRONIC KIDNEY DISEASE) STAGE 3, GFR 30-59 ML/MIN (HCC): ICD-10-CM

## 2019-08-29 DIAGNOSIS — R10.9 ABDOMINAL PAIN, UNSPECIFIED ABDOMINAL LOCATION: Primary | ICD-10-CM

## 2019-08-29 DIAGNOSIS — Z95.0 PACEMAKER: ICD-10-CM

## 2019-08-29 DIAGNOSIS — E66.9 CLASS 2 OBESITY WITH BODY MASS INDEX (BMI) OF 37.0 TO 37.9 IN ADULT, UNSPECIFIED OBESITY TYPE, UNSPECIFIED WHETHER SERIOUS COMORBIDITY PRESENT: ICD-10-CM

## 2019-08-29 DIAGNOSIS — N18.30 DIABETES MELLITUS WITH STAGE 3 CHRONIC KIDNEY DISEASE (HCC): ICD-10-CM

## 2019-08-29 DIAGNOSIS — I10 ESSENTIAL HYPERTENSION: ICD-10-CM

## 2019-08-29 DIAGNOSIS — J44.9 CHRONIC OBSTRUCTIVE PULMONARY DISEASE, UNSPECIFIED COPD TYPE (HCC): ICD-10-CM

## 2019-08-29 DIAGNOSIS — K40.20 NON-RECURRENT BILATERAL INGUINAL HERNIA WITHOUT OBSTRUCTION OR GANGRENE: ICD-10-CM

## 2019-08-29 DIAGNOSIS — E11.22 DIABETES MELLITUS WITH STAGE 3 CHRONIC KIDNEY DISEASE (HCC): ICD-10-CM

## 2019-08-29 PROCEDURE — 99204 OFFICE O/P NEW MOD 45 MIN: CPT | Performed by: SURGERY

## 2019-08-29 RX ORDER — CLINDAMYCIN HYDROCHLORIDE 300 MG/1
300 CAPSULE ORAL 4 TIMES DAILY
COMMUNITY
End: 2020-01-28

## 2019-08-29 NOTE — H&P
New Patient Visit Note       Active Problems      No diagnosis found.     Chief Complaint   Patient presents with:  Hernia: NP reaferred by PCP for hernia, c/o abdominal pain 8/10, chills and fever on and off for past 5 days, pus from navel area, states con history have been reviewed by me today.     Family History   Problem Relation Age of Onset   • Heart Attack Brother    • Heart Surgery Brother         Triple Bypass   • Heart Attack Mother    • Cancer Father         large instestines   • Diabetes Sister times daily.  ), Disp: 540 capsule, Rfl: 1  •  FUROSEMIDE 40 MG Oral Tab, TAKE 1 TABLET TWICE DAILY, Disp: 180 tablet, Rfl: 3  •  carvedilol 25 MG Oral Tab, Take 1 tablet (25 mg total) by mouth 2 (two) times daily with meals. , Disp: 180 tablet, Rfl: 3  •

## 2019-08-29 NOTE — H&P
New Patient Visit Note       Active Problems      1. Abdominal pain, unspecified abdominal location    2. Constipation, unspecified constipation type    3.  Non-recurrent bilateral inguinal hernia without obstruction or gangrene        Chief Complaint   Mila Kruse Lipoma removed from back    • PACEMAKER/DEFIBRILLATOR  11/16/2015    LBBB BiV ICD [email or web address]        The family history and social history have been reviewed by me today.     Social History    Tobacco Use      Smoking status: Never Smoker      Smokeless tobacco today.  Review of Systems   Constitutional: Negative for diaphoresis, fever and unexpected weight change. HENT: Negative for congestion, nosebleeds, sore throat and trouble swallowing.     Eyes: Negative for pain, discharge, redness and visual disturbance Musculoskeletal: Normal range of motion. She exhibits no deformity. Neurological: She is alert and oriented to person, place, and time. Skin: Skin is warm and dry. No rash noted.              History of Present Illness     70-year-old female who is he pain, unspecified abdominal location  (primary encounter diagnosis)  Constipation, unspecified constipation type  Non-recurrent bilateral inguinal hernia without obstruction or gangrene      Plan     Bilateral laparoscopic inguinal herniorrhaphy with mesh

## 2019-08-30 ENCOUNTER — TELEPHONE (OUTPATIENT)
Dept: FAMILY MEDICINE CLINIC | Facility: CLINIC | Age: 74
End: 2019-08-30

## 2019-08-30 NOTE — TELEPHONE ENCOUNTER
Patient called stating she seen the surgeon yesterday and the provider advised her that there charts states Dr. Humble Abdul has treated her for seizures and pt states that is incorrect she has never had seizures and would like to know why it was put in her Deneise Anderson

## 2019-09-03 DIAGNOSIS — G56.22 ULNAR NEUROPATHY AT ELBOW OF LEFT UPPER EXTREMITY: Primary | ICD-10-CM

## 2019-09-03 DIAGNOSIS — G62.9 POLYNEUROPATHY: ICD-10-CM

## 2019-09-03 RX ORDER — GABAPENTIN 100 MG/1
200 CAPSULE ORAL 3 TIMES DAILY
Qty: 540 CAPSULE | Refills: 1 | Status: SHIPPED | OUTPATIENT
Start: 2019-09-03

## 2019-09-03 NOTE — TELEPHONE ENCOUNTER
Received fax from [facility] requesting refill of Gabapentin 100mg Caps.     Medication: gabapentin 100 MG Oral Cap    Date of last refill: 02/13/2019 (#540/1)  [place]     Last office visit: 7/17/2019  Due back to clinic per

## 2019-09-08 ENCOUNTER — TELEPHONE (OUTPATIENT)
Dept: FAMILY MEDICINE CLINIC | Facility: CLINIC | Age: 74
End: 2019-09-08

## 2019-09-08 DIAGNOSIS — D50.9 IRON DEFICIENCY ANEMIA, UNSPECIFIED IRON DEFICIENCY ANEMIA TYPE: Primary | ICD-10-CM

## 2019-09-09 RX ORDER — LIDOCAINE HYDROCHLORIDE 20 MG/ML
SOLUTION ORAL; TOPICAL
Qty: 60 TABLET | Refills: 2 | Status: SHIPPED | OUTPATIENT
Start: 2019-09-09 | End: 2019-09-10

## 2019-09-09 NOTE — TELEPHONE ENCOUNTER
Pt requesting refill of Ferrous refill approved, sent to pharmacy:     Last Office Visit with PCP: 8/12/19    future appointment 9/20/19

## 2019-09-10 NOTE — TELEPHONE ENCOUNTER
Please cancel prescription for [facility] and have patient complete outstanding blood test for iron level.

## 2019-09-10 NOTE — TELEPHONE ENCOUNTER
Left message that Iron can not be refilled until she completes outstanding labs.    Rx has been cancelled

## 2019-09-11 DIAGNOSIS — N18.30 DIABETES MELLITUS WITH STAGE 3 CHRONIC KIDNEY DISEASE (HCC): Primary | ICD-10-CM

## 2019-09-11 DIAGNOSIS — E11.22 DIABETES MELLITUS WITH STAGE 3 CHRONIC KIDNEY DISEASE (HCC): Primary | ICD-10-CM

## 2019-09-11 LAB
ALBUMIN/GLOBULIN RATIO: 1.4 (CALC) (ref 1–2.5)
ALBUMIN: 4 G/DL (ref 3.6–5.1)
ALKALINE PHOSPHATASE: 73 U/L (ref 33–130)
ALT: 12 U/L (ref 6–29)
AST: 15 U/L (ref 10–35)
BILIRUBIN, TOTAL: 0.4 MG/DL (ref 0.2–1.2)
BUN/CREATININE RATIO: 25 (CALC) (ref 6–22)
BUN: 43 MG/DL (ref 7–25)
CALCIUM: 9.1 MG/DL (ref 8.6–10.4)
CARBON DIOXIDE: 29 MMOL/L (ref 20–32)
CHLORIDE: 103 MMOL/L (ref 98–110)
CREATININE: 1.69 MG/DL (ref 0.6–0.93)
EGFR IF AFRICN AM: 34 ML/MIN/1.73M2
EGFR IF NONAFRICN AM: 29 ML/MIN/1.73M2
GLOBULIN: 2.9 G/DL (CALC) (ref 1.9–3.7)
GLUCOSE: 103 MG/DL (ref 65–99)
POTASSIUM: 3.7 MMOL/L (ref 3.5–5.3)
PROTEIN, TOTAL: 6.9 G/DL (ref 6.1–8.1)
SODIUM: 142 MMOL/L (ref 135–146)

## 2019-09-12 LAB
% SATURATION: 17 % (CALC) (ref 16–45)
HEMOGLOBIN A1C: 5.8 % OF TOTAL HGB
IRON BINDING CAPACITY: 327 MCG/DL (CALC) (ref 250–450)
IRON, TOTAL: 54 MCG/DL (ref 45–160)

## 2019-09-17 ENCOUNTER — TELEPHONE (OUTPATIENT)
Dept: SURGERY | Facility: CLINIC | Age: 74
End: 2019-09-17

## 2019-09-17 ENCOUNTER — APPOINTMENT (OUTPATIENT)
Dept: LAB | Age: 74
End: 2019-09-17
Attending: SURGERY
Payer: MEDICARE

## 2019-09-17 ENCOUNTER — OFFICE VISIT (OUTPATIENT)
Dept: PODIATRY CLINIC | Facility: CLINIC | Age: 74
End: 2019-09-17
Payer: MEDICARE

## 2019-09-17 DIAGNOSIS — E11.22 DIABETES MELLITUS WITH STAGE 3 CHRONIC KIDNEY DISEASE (HCC): ICD-10-CM

## 2019-09-17 DIAGNOSIS — K59.00 CONSTIPATION, UNSPECIFIED CONSTIPATION TYPE: ICD-10-CM

## 2019-09-17 DIAGNOSIS — L84 CALLUS OF FOOT: ICD-10-CM

## 2019-09-17 DIAGNOSIS — I51.89 DIASTOLIC DYSFUNCTION: ICD-10-CM

## 2019-09-17 DIAGNOSIS — N18.30 DIABETES MELLITUS WITH STAGE 3 CHRONIC KIDNEY DISEASE (HCC): ICD-10-CM

## 2019-09-17 DIAGNOSIS — N18.30 CKD (CHRONIC KIDNEY DISEASE) STAGE 3, GFR 30-59 ML/MIN (HCC): ICD-10-CM

## 2019-09-17 DIAGNOSIS — Z95.0 PACEMAKER: ICD-10-CM

## 2019-09-17 DIAGNOSIS — B35.1 ONYCHOMYCOSIS: ICD-10-CM

## 2019-09-17 DIAGNOSIS — R20.0 NUMBNESS AND TINGLING OF BOTH LOWER EXTREMITIES: ICD-10-CM

## 2019-09-17 DIAGNOSIS — I10 ESSENTIAL HYPERTENSION: ICD-10-CM

## 2019-09-17 DIAGNOSIS — J44.9 CHRONIC OBSTRUCTIVE PULMONARY DISEASE, UNSPECIFIED COPD TYPE (HCC): ICD-10-CM

## 2019-09-17 DIAGNOSIS — R10.9 ABDOMINAL PAIN, UNSPECIFIED ABDOMINAL LOCATION: ICD-10-CM

## 2019-09-17 DIAGNOSIS — R60.9 EDEMA, UNSPECIFIED TYPE: ICD-10-CM

## 2019-09-17 DIAGNOSIS — G62.9 POLYNEUROPATHY: ICD-10-CM

## 2019-09-17 DIAGNOSIS — K40.20 NON-RECURRENT BILATERAL INGUINAL HERNIA WITHOUT OBSTRUCTION OR GANGRENE: ICD-10-CM

## 2019-09-17 DIAGNOSIS — M77.41 METATARSALGIA OF RIGHT FOOT: ICD-10-CM

## 2019-09-17 DIAGNOSIS — L60.2 ONYCHOGRYPHOSIS: Primary | ICD-10-CM

## 2019-09-17 DIAGNOSIS — R60.0 BILATERAL LOWER EXTREMITY EDEMA: ICD-10-CM

## 2019-09-17 DIAGNOSIS — E66.9 CLASS 2 OBESITY WITH BODY MASS INDEX (BMI) OF 37.0 TO 37.9 IN ADULT, UNSPECIFIED OBESITY TYPE, UNSPECIFIED WHETHER SERIOUS COMORBIDITY PRESENT: ICD-10-CM

## 2019-09-17 DIAGNOSIS — R20.2 NUMBNESS AND TINGLING OF BOTH LOWER EXTREMITIES: ICD-10-CM

## 2019-09-17 LAB
ATRIAL RATE: 60 BPM
P AXIS: 68 DEGREES
P-R INTERVAL: 174 MS
Q-T INTERVAL: 500 MS
QRS DURATION: 162 MS
QTC CALCULATION (BEZET): 500 MS
R AXIS: 78 DEGREES
T AXIS: 44 DEGREES
VENTRICULAR RATE: 60 BPM

## 2019-09-17 PROCEDURE — 93010 ELECTROCARDIOGRAM REPORT: CPT | Performed by: INTERNAL MEDICINE

## 2019-09-17 PROCEDURE — 93005 ELECTROCARDIOGRAM TRACING: CPT

## 2019-09-17 PROCEDURE — 11721 DEBRIDE NAIL 6 OR MORE: CPT | Performed by: PODIATRIST

## 2019-09-17 PROCEDURE — 11055 PARING/CUTG B9 HYPRKER LES 1: CPT | Performed by: PODIATRIST

## 2019-09-17 PROCEDURE — 99203 OFFICE O/P NEW LOW 30 MIN: CPT | Performed by: PODIATRIST

## 2019-09-20 ENCOUNTER — OFFICE VISIT (OUTPATIENT)
Dept: FAMILY MEDICINE CLINIC | Facility: CLINIC | Age: 74
End: 2019-09-20
Payer: MEDICARE

## 2019-09-20 VITALS
TEMPERATURE: 99 F | OXYGEN SATURATION: 97 % | SYSTOLIC BLOOD PRESSURE: 90 MMHG | WEIGHT: 218.38 LBS | HEIGHT: 62.5 IN | HEART RATE: 72 BPM | BODY MASS INDEX: 39.18 KG/M2 | DIASTOLIC BLOOD PRESSURE: 60 MMHG

## 2019-09-20 DIAGNOSIS — K40.20 NON-RECURRENT BILATERAL INGUINAL HERNIA WITHOUT OBSTRUCTION OR GANGRENE: ICD-10-CM

## 2019-09-20 DIAGNOSIS — Z23 NEED FOR VACCINATION: ICD-10-CM

## 2019-09-20 DIAGNOSIS — I42.0 CARDIOMYOPATHY, DILATED (HCC): ICD-10-CM

## 2019-09-20 DIAGNOSIS — E11.22 DIABETES MELLITUS WITH STAGE 3 CHRONIC KIDNEY DISEASE (HCC): ICD-10-CM

## 2019-09-20 DIAGNOSIS — Z01.818 PREOP EXAMINATION: Primary | ICD-10-CM

## 2019-09-20 DIAGNOSIS — I50.22 CHRONIC SYSTOLIC CONGESTIVE HEART FAILURE (HCC): ICD-10-CM

## 2019-09-20 DIAGNOSIS — D50.9 IRON DEFICIENCY ANEMIA, UNSPECIFIED IRON DEFICIENCY ANEMIA TYPE: ICD-10-CM

## 2019-09-20 DIAGNOSIS — I10 ESSENTIAL HYPERTENSION: ICD-10-CM

## 2019-09-20 DIAGNOSIS — Z95.0 PACEMAKER: ICD-10-CM

## 2019-09-20 DIAGNOSIS — N18.30 DIABETES MELLITUS WITH STAGE 3 CHRONIC KIDNEY DISEASE (HCC): ICD-10-CM

## 2019-09-20 PROCEDURE — 99214 OFFICE O/P EST MOD 30 MIN: CPT | Performed by: FAMILY MEDICINE

## 2019-09-20 PROCEDURE — 90662 IIV NO PRSV INCREASED AG IM: CPT | Performed by: FAMILY MEDICINE

## 2019-09-20 PROCEDURE — G0008 ADMIN INFLUENZA VIRUS VAC: HCPCS | Performed by: FAMILY MEDICINE

## 2019-09-20 RX ORDER — HYDROCHLOROTHIAZIDE 25 MG/1
1 TABLET ORAL DAILY
COMMUNITY
Start: 2019-08-30 | End: 2019-11-04

## 2019-09-20 RX ORDER — AMLODIPINE BESYLATE 5 MG/1
TABLET ORAL
COMMUNITY
Start: 2019-08-30 | End: 2019-09-20

## 2019-09-20 NOTE — PATIENT INSTRUCTIONS
-Do not take the amlodipine which you have been off of since seeing Dr. Alex Mckeon October 2018.      -Call Dr. Arnulfo Salinas to find out when she wants you to stop taking the aspirin.    -Do not stop taking all of your medications.    -We are waiting for a

## 2019-09-20 NOTE — PROGRESS NOTES
John Rocha is a 76year old female.   HPI:       This is a 60-year-old -American female with dilated cardiomyopathy, CHF, pacemaker in place, hypertension, and diabetes who presents for preop medical clearance as requested by her surgeon  Essential hypertension     Pacemaker     Hypokalemia     Iron deficiency anemia     CKD (chronic kidney disease) stage 3, GFR 30-59 ml/min (HCC)     Cardiomyopathy, dilated (HCC)     Chronic systolic congestive heart failure (HCC)     Diabetes mellitus wit left eye with active choroidal neovascularization ([facility] [address] 9/5/2018   • Gait instability 5/7/2018   • Hypokalemia 3/17/2017   • Iron deficiency anemia 3/17/2017   • Lumbar facet arthropathy 9/5/2018   • Mass of right inguinal region 6/2/2018   • Numbness and kg/m² as calculated from the following:    Height as of this encounter: 62.5\". Weight as of this encounter: 218 lb 6.4 oz. Physical Exam   Vitals reviewed. Constitutional: She is oriented to person, place, and time and obese.  She appears well-develo ESTIMATED AVERAGE GLUCOSE      68 - 126 mg/dL  128 (H) 123           ASSESSMENT AND PLAN:     Preop examination  (primary encounter diagnosis)  Non-recurrent bilateral inguinal hernia without obstruction or gangrene  Cardiomyopathy, dilated (hcc)  Chroni Non-recurrent bilateral inguinal hernia without obstruction or gangrene  Await cardiac risk assessment and recommendations from cardiologist.    3. Cardiomyopathy, dilated ([address]  4. Chronic systolic congestive heart failure ([address]  5.  Essential hypertension

## 2019-09-21 PROBLEM — R10.33 PERIUMBILICAL ABDOMINAL PAIN: Status: RESOLVED | Noted: 2019-08-12 | Resolved: 2019-09-21

## 2019-09-21 PROBLEM — L03.316 CELLULITIS, UMBILICAL: Status: RESOLVED | Noted: 2019-05-30 | Resolved: 2019-09-21

## 2019-09-21 PROBLEM — L03.316 CELLULITIS OF UMBILICUS: Status: RESOLVED | Noted: 2019-08-12 | Resolved: 2019-09-21

## 2019-09-21 PROBLEM — R19.09 MASS OF RIGHT INGUINAL REGION: Status: RESOLVED | Noted: 2018-06-02 | Resolved: 2019-09-21

## 2019-09-21 PROBLEM — R59.9 ENLARGED LYMPH NODE: Status: RESOLVED | Noted: 2019-08-12 | Resolved: 2019-09-21

## 2019-09-21 PROBLEM — R19.8: Status: RESOLVED | Noted: 2019-08-12 | Resolved: 2019-09-21

## 2019-09-21 PROBLEM — K40.20 NON-RECURRENT BILATERAL INGUINAL HERNIA WITHOUT OBSTRUCTION OR GANGRENE: Status: ACTIVE | Noted: 2019-09-21

## 2019-09-21 NOTE — PROGRESS NOTES
Cy Ko is a 76year old female. Patient presents with:  Consult: pt in office for toenail . pt states she used to go to the spa, but now they refuse. pt is diabetic. Lov w/pcp Dr Ortega [place] [address], hgba1c 5.8 on 19. bg this am unchecked. taking differently: Take 5 mg by mouth 3 (three) times daily.  ) Disp: 90 tablet Rfl: 3   Rosuvastatin Calcium 10 MG Oral Tab Take 1 tablet (10 mg total) by mouth nightly.  Disp: 90 tablet Rfl: 1   FUROSEMIDE 40 MG Oral Tab TAKE 1 TABLET TWICE DAILY Disp: 1 • Heart Attack Brother    • Heart Surgery Brother         Triple Bypass   • Heart Attack Mother    • Cancer Father         large instestines   • Diabetes Sister    • Diabetes Sister    • Heart Attack Son       Social History    Socioeconomic History patient has peripheral edema has weakly palpable pulses both dorsalis pedis and posterior tibial because of that.    3. Neurologic: Patient has diminished sensorium she cannot feel the [place] 10 g filament in the digital dermatomes but can feel it

## 2019-09-25 NOTE — PROGRESS NOTES
Patient may proceed with surgery, cardiac clearance note received from cardiologist, Dr. Slime Torres, stating that patient may proceed with surgery and is at low cardiovascular risk.   No perioperative recommendations were offered by Dr. Mcnamara Abts

## 2019-10-02 ENCOUNTER — TELEPHONE (OUTPATIENT)
Dept: FAMILY MEDICINE CLINIC | Facility: CLINIC | Age: 74
End: 2019-10-02

## 2019-10-02 NOTE — TELEPHONE ENCOUNTER
I called pt to see if she wanted to reschedule her appt from today and she said she was told to just make an appt but she never intended on coming in for the appt. She said it was to early for her to come in for post-op.       She also said she has been as

## 2019-10-02 NOTE — TELEPHONE ENCOUNTER
Phoned patient with information about DME sent 7/31/19. She doesn't want the information at this time. She will call back for it.

## 2019-10-03 ENCOUNTER — TELEPHONE (OUTPATIENT)
Dept: FAMILY MEDICINE CLINIC | Facility: CLINIC | Age: 74
End: 2019-10-03

## 2019-10-03 DIAGNOSIS — R20.0 NUMBNESS AND TINGLING OF BOTH LOWER EXTREMITIES: ICD-10-CM

## 2019-10-03 DIAGNOSIS — Z79.899 POLYPHARMACY: Primary | ICD-10-CM

## 2019-10-03 DIAGNOSIS — G62.9 POLYNEUROPATHY: ICD-10-CM

## 2019-10-03 DIAGNOSIS — R29.898 WEAKNESS OF BOTH LOWER EXTREMITIES: ICD-10-CM

## 2019-10-03 DIAGNOSIS — R26.81 GAIT INSTABILITY: ICD-10-CM

## 2019-10-03 DIAGNOSIS — R20.2 NUMBNESS AND TINGLING OF BOTH LOWER EXTREMITIES: ICD-10-CM

## 2019-10-03 NOTE — TELEPHONE ENCOUNTER
Jose Martin Fernandez is calling from [facility] to let the office know that they will not be accepting Tg Schulz as a patient because they do not accept her insurance.

## 2019-10-03 NOTE — TELEPHONE ENCOUNTER
Received a fax from [address]. Requesting Blood sugar testing supplies and knee brace. Per Dr. Rhiannon Puckett, patient will need [facility] PT to eval and treat as necessary. Referral placed. Patient aware.

## 2019-10-04 ENCOUNTER — TELEPHONE (OUTPATIENT)
Dept: FAMILY MEDICINE CLINIC | Facility: CLINIC | Age: 74
End: 2019-10-04

## 2019-10-04 NOTE — TELEPHONE ENCOUNTER
Hugo from [facility] called and said he needs the last clinical note from her last visit.

## 2019-10-18 ENCOUNTER — TELEPHONE (OUTPATIENT)
Dept: SURGERY | Facility: CLINIC | Age: 74
End: 2019-10-18

## 2019-10-18 NOTE — TELEPHONE ENCOUNTER
Patient called today and had spoken with an MA. The phone call was referred to me. Patient c/o of abdominal pain with tenderness. Also, c/o \"swollen legs from stomach down. \" Patient states that her legs are \"so swollen when I touch them. \" Patient also

## 2019-11-19 RX ORDER — ROSUVASTATIN CALCIUM 5 MG/1
TABLET, COATED ORAL
Qty: 90 TABLET | Refills: 0 | OUTPATIENT
Start: 2019-11-19

## 2020-02-07 RX ORDER — ROSUVASTATIN CALCIUM 5 MG/1
TABLET, COATED ORAL
Qty: 90 TABLET | Refills: 0 | OUTPATIENT
Start: 2020-02-07

## 2020-02-12 DIAGNOSIS — N18.30 DIABETES MELLITUS WITH STAGE 3 CHRONIC KIDNEY DISEASE (HCC): ICD-10-CM

## 2020-02-12 DIAGNOSIS — E11.22 DIABETES MELLITUS WITH STAGE 3 CHRONIC KIDNEY DISEASE (HCC): ICD-10-CM

## 2020-02-12 DIAGNOSIS — Z79.899 ENCOUNTER FOR LONG-TERM CURRENT USE OF MEDICATION: ICD-10-CM

## 2020-02-13 RX ORDER — LINAGLIPTIN 5 MG/1
TABLET, FILM COATED ORAL
Qty: 90 TABLET | Refills: 3 | Status: SHIPPED | OUTPATIENT
Start: 2020-02-13

## 2020-06-10 ENCOUNTER — TELEPHONE (OUTPATIENT)
Dept: FAMILY MEDICINE CLINIC | Facility: CLINIC | Age: 75
End: 2020-06-10

## 2020-06-10 NOTE — TELEPHONE ENCOUNTER
A medical record request was received from [facility] on 6/10/20. They are looking for OV notes, labs, vaccination record, med list and all imaging reports. It was sent to [employer] on 6/10/20 in a green bag with tag# [id number].

## 2020-09-19 NOTE — TELEPHONE ENCOUNTER
Most recent progress note and lab results received from Dr. Kelechi Mishra office, they have no immunizations for her. normal (ped)... need for outpatient follow-up/return to ED if symptoms worsen, persist or questions arise

## 2020-12-02 DIAGNOSIS — E11.22 DIABETES MELLITUS WITH STAGE 3 CHRONIC KIDNEY DISEASE (HCC): ICD-10-CM

## 2020-12-02 DIAGNOSIS — N18.30 DIABETES MELLITUS WITH STAGE 3 CHRONIC KIDNEY DISEASE (HCC): ICD-10-CM

## 2020-12-02 DIAGNOSIS — Z79.899 ENCOUNTER FOR LONG-TERM CURRENT USE OF MEDICATION: ICD-10-CM

## 2020-12-03 RX ORDER — LINAGLIPTIN 5 MG/1
TABLET, FILM COATED ORAL
Qty: 90 TABLET | Refills: 3 | OUTPATIENT
Start: 2020-12-03

## 2020-12-03 NOTE — TELEPHONE ENCOUNTER
Requested Prescriptions     Refused Prescriptions Disp Refills   • TRADJENTA 5 MG Oral Tab [Pharmacy Med Name: TRADJENTA 5 MG Tablet] 90 tablet 3     Sig: TAKE 1 TABLET EVERY MORNING     Refused By: Wallace Montejo     Reason for Refusal: Patient has request

## 2021-01-25 DIAGNOSIS — Z23 NEED FOR VACCINATION: ICD-10-CM

## 2021-02-08 RX ORDER — LIDOCAINE HYDROCHLORIDE 20 MG/ML
SOLUTION ORAL; TOPICAL
Qty: 60 TABLET | Refills: 0 | OUTPATIENT
Start: 2021-02-08

## 2021-02-08 NOTE — TELEPHONE ENCOUNTER
FERROUS SULFATE 325MG (5GR) TABS 11/13/2020 09/09/2019  60 each  [address] 9/20/19  Rx denied.  Due for follow up

## 2021-05-25 NOTE — TELEPHONE ENCOUNTER
Pt informed of test results, pt verbalized understanding and had no questions at this time. Epidermal Autograft Text: The defect edges were debeveled with a #15 scalpel blade.  Given the location of the defect, shape of the defect and the proximity to free margins an epidermal autograft was deemed most appropriate.  Using a sterile surgical marker, the primary defect shape was transferred to the donor site. The epidermal graft was then harvested.  The skin graft was then placed in the primary defect and oriented appropriately.

## 2023-12-21 NOTE — TELEPHONE ENCOUNTER
Future Appointments  Date Time Provider Toni Kelin   11/8/2017 11:30 AM Simran Horton DO EMG 28 [facility]G [place]   12/20/2017 10:30 AM [id number]   12/20/2017 11:00 AM [id number] [id number] CARD [facility]

## (undated) NOTE — LETTER
09/12/18      Dear: Alisa Farr      In an effort to provide you with the best possible care for your diabetes, we ask that you please schedule the following appointment(s) as indicated below.   Our records indicate that you are in need of the follo

## (undated) NOTE — MR AVS SNAPSHOT
[facility]  [phone]               Thank you for choosing us for your health care visit with Edilberto Deleon DO.   We are glad to serve you and happy to provide you with this s long-term current use of insulin ([address] [E11.9]                 Follow-up Instructions     Return in about 2 weeks (around 3/31/2017) for Blood test results.          Reason for Today's Visit     Establish Care           Medical Issues Discussed Today     Ty Commonly known as:  COZAAR           MetFORMIN HCl 500 MG Tabs   Take 500 mg by mouth 2 (two) times daily with meals. Commonly known as:  GLUCOPHAGE           Potassium Chloride ER 10 MEQ Cpcr   Take 10 mEq by mouth daily.    Commonly known as:  Gemma Carter Visit [facility] online at  [email or web address]

## (undated) NOTE — LETTER
Valery Crow M.D., F.A.C.S.     Surgical Clearance Needed    Date: 8/29/2019                                                                       From: Wilfredo Foley RN    Attn: Angle Basilio DO                                                    Fax:     RE:

## (undated) NOTE — LETTER
19      Dr. Ashley Zambrano    I am seeking cardiac clearance for a mutual patient Jefferson Cota  1945. She is having  Bilateral laparoscopic inguinal herniorrhaphy with mesh tentatively scheduled 2019 by Dr. Shiva Dela Cruz.      Pl

## (undated) NOTE — Clinical Note
Date: January 26, 2017    Dear Inell Ask,  We have been notified that you have selected one of the [facility] insurance plans for this current enrollment year.   One of the covered benefits of this plan is that you are entitled to receive an Annual Heal

## (undated) NOTE — LETTER
07/03/19        [address] [facility] [address]      Dear Shaila Schumacher,    Our records indicate that you have outstanding lab work and or testing that was ordered for you and has not yet been completed:  Orders Placed This Encoun

## (undated) NOTE — LETTER
09/28/18        [address]      Dear Marii Bhakta,    Our records indicate that you have outstanding lab work and or testing that was ordered for you and has not yet been completed:        MRI SPINE LUMBAR (W

## (undated) NOTE — MR AVS SNAPSHOT
[facility] [place] [facility] k[facility]  [phone]               Thank you for choosing us for your health care visit with Mary Ann Hill DO.   We are glad to serve you and happy to provide you with this s Order:  Surgery - Internal    Rosanna Camacho MD   [address] [address]   Phone:  [phone]   Fax:  [phone]       Comment:  Consultation for Screening Colonoscopy          Mark Herrera, [address] schedule your appointment. Failure to obtain required authorization numbers can create reimbursement difficulties for you. Patient has h/o CHF and pacemaker placement.   She was under the care of Dr. Ethel Sandra through [employer] for her CHF a requirements for authorization, please wait 5-7 days and then contact your physician's office. At that time, you will be provided with any authorization numbers or be assured that none are required. You can then schedule your appointment.  Failure to obtain anyone to review and print using their home computer and printer. (the forms are also available in [address])  [email or web address]  This link also has information from the [address] [place] [address] regarding Advance Directives.        Allergies as of A

## (undated) NOTE — LETTER
02/04/19        [address]      Dear Kedar Has,    Our records indicate that you have outstanding lab work and or testing that was ordered for you and has not yet been completed:        XR DEXA BONE DENSIT

## (undated) NOTE — LETTER
05/30/18        [address] [place] [address] [place] [address]      Dear Carolyn Julian,    Our records indicate that you have outstanding lab work and or testing that was ordered for you and has not yet been completed:          CBC With D

## (undated) NOTE — LETTER
19    Patient: John Rocha  : 1945 Visit date: 2019    Dear  Dr. Jama Renee,    Thank you for referring John Rocha to my practice. Please find my assessment and plan below. Assessment   No diagnosis found.